# Patient Record
Sex: FEMALE | Race: WHITE | NOT HISPANIC OR LATINO | Employment: STUDENT | URBAN - METROPOLITAN AREA
[De-identification: names, ages, dates, MRNs, and addresses within clinical notes are randomized per-mention and may not be internally consistent; named-entity substitution may affect disease eponyms.]

---
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
## 2020-02-06 ENCOUNTER — DOCUMENTATION ONLY (OUTPATIENT)
Dept: FAMILY MEDICINE | Facility: CLINIC | Age: 20
End: 2020-02-06
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
## 2020-02-10 ENCOUNTER — OFFICE VISIT (OUTPATIENT)
Dept: FAMILY MEDICINE | Facility: CLINIC | Age: 20
End: 2020-02-10
Payer: COMMERCIAL

## 2020-02-10 VITALS
SYSTOLIC BLOOD PRESSURE: 121 MMHG | HEIGHT: 69 IN | WEIGHT: 132.2 LBS | DIASTOLIC BLOOD PRESSURE: 74 MMHG | BODY MASS INDEX: 19.58 KG/M2 | HEART RATE: 90 BPM

## 2020-02-10 DIAGNOSIS — F41.1 GAD (GENERALIZED ANXIETY DISORDER): Primary | ICD-10-CM

## 2020-02-10 DIAGNOSIS — G89.29 CHRONIC LEFT-SIDED LOW BACK PAIN WITHOUT SCIATICA: ICD-10-CM

## 2020-02-10 DIAGNOSIS — M54.50 CHRONIC LEFT-SIDED LOW BACK PAIN WITHOUT SCIATICA: ICD-10-CM

## 2020-02-10 ASSESSMENT — MIFFLIN-ST. JEOR: SCORE: 1439.04
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
## 2020-02-10 NOTE — LETTER
Date:February 24, 2020      Patient was self referred, no letter generated. Do not send.        [facility] Health Information
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
## 2020-02-11 DIAGNOSIS — G89.29 CHRONIC LEFT-SIDED LOW BACK PAIN WITHOUT SCIATICA: Primary | ICD-10-CM

## 2020-02-11 DIAGNOSIS — M54.50 CHRONIC LEFT-SIDED LOW BACK PAIN WITHOUT SCIATICA: Primary | ICD-10-CM

## 2020-02-13 ENCOUNTER — ANCILLARY PROCEDURE (OUTPATIENT)
Dept: MRI IMAGING | Facility: CLINIC | Age: 20
End: 2020-02-13
Attending: FAMILY MEDICINE
Payer: COMMERCIAL

## 2020-02-13 DIAGNOSIS — M54.50 CHRONIC LEFT-SIDED LOW BACK PAIN WITHOUT SCIATICA: ICD-10-CM

## 2020-02-13 DIAGNOSIS — G89.29 CHRONIC LEFT-SIDED LOW BACK PAIN WITHOUT SCIATICA: ICD-10-CM

## 2020-02-17 ENCOUNTER — OFFICE VISIT (OUTPATIENT)
Dept: FAMILY MEDICINE | Facility: CLINIC | Age: 20
End: 2020-02-17
Payer: COMMERCIAL

## 2020-02-17 VITALS
SYSTOLIC BLOOD PRESSURE: 118 MMHG | HEART RATE: 94 BPM | WEIGHT: 135 LBS | HEIGHT: 69 IN | BODY MASS INDEX: 19.99 KG/M2 | DIASTOLIC BLOOD PRESSURE: 76 MMHG

## 2020-02-17 DIAGNOSIS — G89.29 CHRONIC LEFT-SIDED LOW BACK PAIN WITHOUT SCIATICA: Primary | ICD-10-CM

## 2020-02-17 DIAGNOSIS — M54.50 CHRONIC LEFT-SIDED LOW BACK PAIN WITHOUT SCIATICA: Primary | ICD-10-CM

## 2020-02-17 DIAGNOSIS — F41.1 GAD (GENERALIZED ANXIETY DISORDER): ICD-10-CM

## 2020-02-17 ASSESSMENT — MIFFLIN-ST. JEOR: SCORE: 1451.74
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
## 2020-02-17 NOTE — LETTER
Date:February 24, 2020      Patient was self referred, no letter generated. Do not send.        [facility] Health Information
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
## 2020-02-27 ENCOUNTER — DOCUMENTATION ONLY (OUTPATIENT)
Dept: FAMILY MEDICINE | Facility: CLINIC | Age: 20
End: 2020-02-27
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
## 2020-04-13 ENCOUNTER — VIRTUAL VISIT (OUTPATIENT)
Dept: FAMILY MEDICINE | Facility: CLINIC | Age: 20
End: 2020-04-13
Payer: COMMERCIAL

## 2020-04-13 DIAGNOSIS — M54.50 LEFT-SIDED LOW BACK PAIN WITHOUT SCIATICA, UNSPECIFIED CHRONICITY: ICD-10-CM

## 2020-04-13 DIAGNOSIS — F41.1 GAD (GENERALIZED ANXIETY DISORDER): ICD-10-CM
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
## 2020-04-29 DIAGNOSIS — E61.1 IRON DEFICIENCY: Primary | ICD-10-CM

## 2020-05-29 DIAGNOSIS — J02.9 PHARYNGITIS, UNSPECIFIED ETIOLOGY: Primary | ICD-10-CM

## 2020-05-29 DIAGNOSIS — R21 RASH AND NONSPECIFIC SKIN ERUPTION: ICD-10-CM

## 2020-05-29 DIAGNOSIS — J02.9 PHARYNGITIS, UNSPECIFIED ETIOLOGY: ICD-10-CM

## 2020-05-29 LAB
BASOPHILS # BLD AUTO: 0 10E9/L (ref 0–0.2)
BASOPHILS NFR BLD AUTO: 0.2 %
DIFFERENTIAL METHOD BLD: NORMAL
EOSINOPHIL # BLD AUTO: 0.3 10E9/L (ref 0–0.7)
EOSINOPHIL NFR BLD AUTO: 4.3 %
ERYTHROCYTE [DISTWIDTH] IN BLOOD BY AUTOMATED COUNT: 12.2 % (ref 10–15)
HCT VFR BLD AUTO: 41.1 % (ref 35–47)
HETEROPH AB SER QL: POSITIVE
HGB BLD-MCNC: 13.2 G/DL (ref 11.7–15.7)
IMM GRANULOCYTES # BLD: 0 10E9/L (ref 0–0.4)
IMM GRANULOCYTES NFR BLD: 0.5 %
LYMPHOCYTES # BLD AUTO: 1.7 10E9/L (ref 0.8–5.3)
LYMPHOCYTES NFR BLD AUTO: 27.4 %
MCH RBC QN AUTO: 30.8 PG (ref 26.5–33)
MCHC RBC AUTO-ENTMCNC: 32.1 G/DL (ref 31.5–36.5)
MCV RBC AUTO: 96 FL (ref 78–100)
MONOCYTES # BLD AUTO: 0.5 10E9/L (ref 0–1.3)
MONOCYTES NFR BLD AUTO: 8.4 %
NEUTROPHILS # BLD AUTO: 3.8 10E9/L (ref 1.6–8.3)
NEUTROPHILS NFR BLD AUTO: 59.2 %
NRBC # BLD AUTO: 0 10*3/UL
NRBC BLD AUTO-RTO: 0 /100
PLATELET # BLD AUTO: 310 10E9/L (ref 150–450)
RBC # BLD AUTO: 4.28 10E12/L (ref 3.8–5.2)
WBC # BLD AUTO: 6.3 10E9/L (ref 4–11)

## 2020-05-29 RX ORDER — PREDNISONE 20 MG/1
40 TABLET ORAL DAILY
Qty: 14 TABLET | Refills: 0 | Status: SHIPPED | OUTPATIENT
Start: 2020-05-29 | End: 2020-06-05

## 2020-06-01 LAB
EBV NA IGG SER QL IA: <0.2 AI (ref 0–0.8)
EBV VCA IGG SER QL IA: <0.2 AI (ref 0–0.8)
EBV VCA IGM SER QL IA: 1.5 AI (ref 0–0.8)

## 2020-06-11 DIAGNOSIS — F41.1 GAD (GENERALIZED ANXIETY DISORDER): ICD-10-CM

## 2020-06-22 ENCOUNTER — OFFICE VISIT (OUTPATIENT)
Dept: ORTHOPEDICS | Facility: CLINIC | Age: 20
End: 2020-06-22
Payer: COMMERCIAL

## 2020-06-22 DIAGNOSIS — B27.00 GAMMAHERPESVIRAL MONONUCLEOSIS WITHOUT COMPLICATION: Primary | ICD-10-CM

## 2020-06-22 NOTE — LETTER
"  6/22/2020      RE: Smitha Seaman  [address] [address]       TELEPHONE VISIT    The patient has been notified of following:     \"This telephone visit will be conducted via a call between you and your physician/provider. We have found that certain health care needs can be provided without the need for a physical exam.  This service lets us provide the care you need with a short phone conversation.  If a prescription is necessary we can send it directly to your pharmacy.  If lab work is needed we can place an order for that and you can then stop by our lab to have the test done at a later time.    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  CC was seen by  virtually for a ST and started on amoxillin for presumed strep throat.  After 2 daysday of medication she broke out in a full body rash.  She had previously taken amoxicillin with out problems before.  She thinks her first symptoms were around May 25th.    -+fatigue and swollen lymph nodes anteriorly and posteriorly.    -Still getting tired by the end of the day.  Feels approx 80-90% recovered.  Denies abdominal pain.  ST resolved after a few days of her symptoms starting.    -Rash resolved  -She developed a UTI and was treated with Nitrofurotin with good symptom improvement.   -Lymph nodes are still swollen anteriorly but greatly improved.   -Her brother was diagnosed with mono too.         Current Outpatient Medications   Medication     ferrous sulfate (SLO-FE) 142 (45 Fe) MG CR tablet     sertraline (ZOLOFT) 50 MG tablet     No current " facility-administered medications for this visit.            Objective    NAD  There were no vitals taken for this visit.  HEENT:  Neg   NecK:  1/2 cm AC LAD L>R, no  PC LAD  Heart; rrr  Lungs; cta  Abd; Soft and nttp, no HSM  Skin:  No rash.     Assessment/Plan:  1. Resolving Mono with some symptoms persisting.     PLAN:  No training until this weekend and then start with a slow jog, bike ride and seen how that goes with her energy level.  She is supposed to start light conditioning training with the team on July 1 which may need to be modified depending on how she is feeling.  Will notify her ATC and .  She may advance as tolerated based on her symptoms.     Janna Caba MD, CAQ, FACSM, CCD  [facility]  Sports Medicine and Bone Health  Team Physician;  Athletics              Janna Caba MD

## 2020-06-22 NOTE — PROGRESS NOTES
"TELEPHONE VISIT    The patient has been notified of following:     \"This telephone visit will be conducted via a call between you and your physician/provider. We have found that certain health care needs can be provided without the need for a physical exam.  This service lets us provide the care you need with a short phone conversation.  If a prescription is necessary we can send it directly to your pharmacy.  If lab work is needed we can place an order for that and you can then stop by our lab to have the test done at a later time.    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  CC was seen by  virtually for a ST and started on amoxillin for presumed strep throat.  After 2 daysday of medication she broke out in a full body rash.  She had previously taken amoxicillin with out problems before.  She thinks her first symptoms were around May 25th.    -+fatigue and swollen lymph nodes anteriorly and posteriorly.    -Still getting tired by the end of the day.  Feels approx 80-90% recovered.  Denies abdominal pain.  ST resolved after a few days of her symptoms starting.    -Rash resolved  -She developed a UTI and was treated with Nitrofurotin with good symptom improvement.   -Lymph nodes are still swollen anteriorly but greatly improved.   -Her brother was diagnosed with mono too.         Current Outpatient Medications   Medication     ferrous sulfate (SLO-FE) 142 (45 Fe) MG CR tablet     sertraline (ZOLOFT) 50 MG tablet     No current facility-administered medications for this visit.            Objective    NAD  There were no " vitals taken for this visit.  HEENT:  Neg   NecK:  1/2 cm AC LAD L>R, no  PC LAD  Heart; rrr  Lungs; cta  Abd; Soft and nttp, no HSM  Skin:  No rash.     Assessment/Plan:  1. Resolving Mono with some symptoms persisting.     PLAN:  No training until this weekend and then start with a slow jog, bike ride and seen how that goes with her energy level.  She is supposed to start light conditioning training with the team on July 1 which may need to be modified depending on how she is feeling.  Will notify her ATC and .  She may advance as tolerated based on her symptoms.     Janna Caba MD, CAQ, FACSM, CCD  [facility]  Sports Medicine and Bone Health  Team Physician;  Athletics

## 2020-06-22 NOTE — LETTER
Date:June 23, 2020      Patient was self referred, no letter generated. Do not send.        [facility] Health Information

## 2020-06-23 DIAGNOSIS — Z11.59 SPECIAL SCREENING EXAMINATION FOR VIRAL DISEASE: Primary | ICD-10-CM

## 2020-06-26 DIAGNOSIS — Z11.59 SPECIAL SCREENING EXAMINATION FOR VIRAL DISEASE: ICD-10-CM

## 2020-06-26 NOTE — LETTER
June 29, 2020        Smitha IQBAL Urszula  [address]  [place] [address]        COVID-19 Antibody, IgG   Date Value Ref Range Status   06/26/2020 Negative NEG^Negative Final     Comment:     Negative results do not rule out SARS-CoV-2 infection, particularly in those   who have been in contact with the virus.  Follow-up testing with a molecular   diagnostic should be considered to rule out infection in these individuals.  Results from antibody testing should not be used as the sole basis to diagnose   or exclude SARS-CoV-2 infection or to inform infection status.           You have tested NEGATIVE for COVID-19 antibodies. This suggests you have not had or been exposed to COVID-19. But it does not mean that for sure.     The test finds antibodies in most people 10 days after they get sick. For some people, it takes longer than 10 days for antibodies to show up. Others may never show antibodies against COVID-19, especially if they have weak immune systems.    If you have COVID-19 symptoms now, please stay home and away from others.     What is antibody testing?    This is a kind of blood test. We take a small sample of your blood, and then test it for something called  antibodies.      Your body makes antibodies to fight infection. If your blood has antibodies for a certain germ, it means you ve been infected with that germ in the past.     Sometimes, antibodies stay in your body for years after you ve had the infection. They can be there even if the germ didn t make you sick. They are a sign that your body fought off the infection.    Will this test find antibodies in everyone who s had COVID-19?    No. The test finds antibodies in most people 10 days after they get sick. For some people, it takes longer than 10 days for antibodies to show up. Others may never show antibodies against COVID-19, especially if they have weak immune systems.    What does it mean if the test finds COVID-19 antibodies?    If we find  these antibodies, it suggests:     This person has had the virus.     Their body s immune system fought the virus.     We don t know if this will help protect someone from getting COVID-19 again. Scientists are still learning about this.    What are the signs of COVID-19?    Signs of COVID-19 can appear from 2 to 14 days (up to 2 weeks) after you re infected. Some people have no symptoms or only mild symptoms. Others get very sick. The most common symptoms are:          Cough    Shortness of breath or trouble breathing  Or at least 2 of these symptoms:    Fever    Chills    Repeated shaking with chills    Muscle pain    Headache    Sore throat    Losing your sense of taste or smell    You may have other symptoms. Please contact your doctor or clinic for any symptoms that worry you.    Where can I get more information?     To learn the [facility] guidelines for staying home, please visit the [place] Department of Health website at [email or web address].us/diseases/coronavirus/basics.html    To learn more about COVID-19 and how to care for yourself at home, please visit the CDC website at https://www.cdc.gov/coronavirus/2019-ncov/about/steps-when-sick.html    For more options for care at [facility], please visit our website at [email or web address]    [facility] ([facility]) COVID-19 Hotline:  [phone]

## 2020-06-26 NOTE — LETTER
June 28, 2020        Smitha Seaman  [address] [place] [address]    This letter provides a written record that you were tested for COVID-19 on 6/26/20.       Your result was negative. This means that we didn t find the virus that causes COVID-19 in your sample. A test may show negative when you do actually have the virus. This can happen when the virus is in the early stages of infection, before you feel illness symptoms.    If you have symptoms   Stay home and away from others (self-isolate) until you meet ALL of the guidelines below:    You ve had no fever--and no medicine that reduces fever--for 3 full days (72 hours). And      Your other symptoms have gotten better. For example, your cough or breathing has improved. And     At least 10 days have passed since your symptoms started.    During this time:    Stay home. Don t go to work, school or anywhere else.     Stay in your own room, including for meals. Use your own bathroom if you can.    Stay away from others in your home. No hugging, kissing or shaking hands. No visitors.    Clean  high touch  surfaces often (doorknobs, counters, handles, etc.). Use a household cleaning spray or wipes. You can find a full list on the EPA website at www.epa.gov/pesticide-registration/list-n-disinfectants-use-against-sars-cov-2.    Cover your mouth and nose with a mask, tissue or washcloth to avoid spreading germs.    Wash your hands and face often with soap and water.    Going back to work  Check with your employer for any guidelines to follow for going back to work.    Employers: This document serves as formal notice that your employee tested negative for COVID-19, as of the testing date shown above.

## 2020-06-27 LAB
SARS-COV-2 RNA SPEC QL NAA+PROBE: NOT DETECTED
SPECIMEN SOURCE: NORMAL

## 2020-06-28 DIAGNOSIS — Z11.59 SPECIAL SCREENING EXAMINATION FOR VIRAL DISEASE: ICD-10-CM

## 2020-06-29 LAB
COVID-19 ANTIBODY IGG: NEGATIVE
LAB TEST METHOD: NORMAL
SARS-COV-2 RNA SPEC QL NAA+PROBE: NOT DETECTED
SPECIMEN SOURCE: NORMAL

## 2020-07-23 ENCOUNTER — DOCUMENTATION ONLY (OUTPATIENT)
Dept: FAMILY MEDICINE | Facility: CLINIC | Age: 20
End: 2020-07-23
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
## 2020-07-27 DIAGNOSIS — Z11.59 SPECIAL SCREENING EXAMINATION FOR VIRAL DISEASE: ICD-10-CM

## 2020-07-28 LAB
SARS-COV-2 RNA SPEC QL NAA+PROBE: NOT DETECTED
SPECIMEN SOURCE: NORMAL

## 2020-08-03 ENCOUNTER — DOCUMENTATION ONLY (OUTPATIENT)
Dept: FAMILY MEDICINE | Facility: CLINIC | Age: 20
End: 2020-08-03
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
## 2020-08-12 DIAGNOSIS — Z11.59 SPECIAL SCREENING EXAMINATION FOR VIRAL DISEASE: Primary | ICD-10-CM

## 2020-08-13 DIAGNOSIS — Z11.59 SPECIAL SCREENING EXAMINATION FOR VIRAL DISEASE: ICD-10-CM

## 2020-08-15 LAB
SARS-COV-2 RNA SPEC QL NAA+PROBE: NOT DETECTED
SPECIMEN SOURCE: NORMAL

## 2020-08-18 DIAGNOSIS — Z11.59 SPECIAL SCREENING EXAMINATION FOR VIRAL DISEASE: Primary | ICD-10-CM

## 2020-08-20 DIAGNOSIS — Z11.59 SPECIAL SCREENING EXAMINATION FOR VIRAL DISEASE: ICD-10-CM

## 2020-08-21 LAB
SARS-COV-2 RNA SPEC QL NAA+PROBE: NOT DETECTED
SPECIMEN SOURCE: NORMAL

## 2020-08-27 DIAGNOSIS — Z11.59 SPECIAL SCREENING EXAMINATION FOR VIRAL DISEASE: ICD-10-CM

## 2020-08-28 LAB
SARS-COV-2 RNA SPEC QL NAA+PROBE: NOT DETECTED
SPECIMEN SOURCE: NORMAL

## 2020-09-09 DIAGNOSIS — Z11.59 SPECIAL SCREENING EXAMINATION FOR VIRAL DISEASE: Primary | ICD-10-CM

## 2020-09-12 DIAGNOSIS — Z11.59 SPECIAL SCREENING EXAMINATION FOR VIRAL DISEASE: ICD-10-CM

## 2020-09-12 LAB
SARS-COV-2 RNA SPEC QL NAA+PROBE: NOT DETECTED
SPECIMEN SOURCE: NORMAL

## 2020-09-17 DIAGNOSIS — Z11.59 SPECIAL SCREENING EXAMINATION FOR VIRAL DISEASE: Primary | ICD-10-CM

## 2020-09-18 DIAGNOSIS — Z11.59 SPECIAL SCREENING EXAMINATION FOR VIRAL DISEASE: ICD-10-CM

## 2020-09-19 LAB
SARS-COV-2 RNA SPEC QL NAA+PROBE: NOT DETECTED
SPECIMEN SOURCE: NORMAL

## 2020-09-29 DIAGNOSIS — Z11.59 SPECIAL SCREENING EXAMINATION FOR VIRAL DISEASE: ICD-10-CM

## 2020-09-30 LAB
SARS-COV-2 RNA SPEC QL NAA+PROBE: NOT DETECTED
SPECIMEN SOURCE: NORMAL

## 2020-10-09 DIAGNOSIS — Z11.59 SPECIAL SCREENING EXAMINATION FOR VIRAL DISEASE: ICD-10-CM

## 2020-10-09 PROCEDURE — 99000 SPECIMEN HANDLING OFFICE-LAB: CPT | Performed by: PATHOLOGY

## 2020-10-09 PROCEDURE — U0003 INFECTIOUS AGENT DETECTION BY NUCLEIC ACID (DNA OR RNA); SEVERE ACUTE RESPIRATORY SYNDROME CORONAVIRUS 2 (SARS-COV-2) (CORONAVIRUS DISEASE [COVID-19]), AMPLIFIED PROBE TECHNIQUE, MAKING USE OF HIGH THROUGHPUT TECHNOLOGIES AS DESCRIBED BY CMS-2020-01-R: HCPCS | Mod: 90 | Performed by: PATHOLOGY

## 2020-10-10 LAB
LABORATORY COMMENT REPORT: NORMAL
SARS-COV-2 RNA SPEC QL NAA+PROBE: ABNORMAL
SARS-COV-2 RNA SPEC QL NAA+PROBE: NORMAL
SARS-COV-2 RNA SPEC QL NAA+PROBE: NORMAL
SPECIMEN SOURCE: ABNORMAL
SPECIMEN SOURCE: NORMAL
SPECIMEN SOURCE: NORMAL

## 2020-10-11 ENCOUNTER — TELEPHONE (OUTPATIENT)
Dept: EMERGENCY MEDICINE | Facility: CLINIC | Age: 20
End: 2020-10-11

## 2020-10-11 NOTE — TELEPHONE ENCOUNTER
"Coronavirus (COVID-19) Notification    Caller Name (Patient, parent, daughter/son, grandparent, etc)  Patient     Reason for call  Notify of Positive Coronavirus (COVID-19) lab results, assess symptoms,  review [facility] recommendations    Lab Result    Lab test:  2019-nCoV rRt-PCR or SARS-CoV-2 PCR    Oropharyngeal AND/OR nasopharyngeal swabs is POSITIVE for 2019-nCoV RNA/SARS-COV-2 PCR (COVID-19 virus)    RN Recommendations/Instructions per [facility] Coronavirus COVID-19 recommendations    Brief introduction script  Introduce self then review script:  \"I am calling on behalf of [employer].  We were notified that your Coronavirus test (COVID-19) for was POSITIVE for the virus.  I have some information to relay to you but first I wanted to mention that the [place] Dept of Health will be contacting you shortly [it's possible MD already called Patient] to talk to you more about how you are feeling and other people you have had contact with who might now also have the virus.  Also, [facility] is Partnering with the [facility] for Covid-19 research, you may be contacted directly by research staff.\"    Assessment (Inquire about Patient's current symptoms)   Assessment   Current Symptoms at time of phone call: (if no symptoms, document No symptoms]  None    Symptoms onset (if applicable)  N/A     If at time of call, Patients symptoms hare worsened, the Patient should contact 911 or have someone drive them to Emergency Dept promptly:      If Patient calling 911, inform 911 personal that you have tested positive for the Coronavirus (COVID-19).  Place mask on and await 911 to arrive.    If Emergency Dept, If possible, please have another adult drive you to the Emergency Dept but you need to wear mask when in contact with other people.      Review information with Patient    Your result was positive. This means you have COVID-19 (coronavirus).  We have sent you a letter that reviews the " information that I'll be reviewing with you now.    How can I protect others?    If you have symptoms: stay home and away from others (self-isolate) until:    You've had no fever--and no medicine that reduces fever--for 1 full day (24 hours). And       Your other symptoms have gotten better. For example, your cough or breathing has improved. And     At least 10 days have passed since your symptoms started. (If you've been told by a doctor that you have a weak immune system, wait 20 days.)     If you don't have symptoms: Stay home and away from others (self-isolate) until at least 10 days have passed since your first positive COVID-19 test. (Date test collected)    During this time:    Stay in your own room, including for meals. Use your own bathroom if you can.    Stay away from others in your home. No hugging, kissing or shaking hands. No visitors.     Don't go to work, school or anywhere else.     Clean  high touch  surfaces often (doorknobs, counters, handles, etc.). Use a household cleaning spray or wipes. You'll find a full list on the EPA website at www.epa.gov/pesticide-registration/list-n-disinfectants-use-against-sars-cov-2.     Cover your mouth and nose with a mask, tissue or other face covering to avoid spreading germs.    Wash your hands and face often with soap and water.    Caregivers in these groups are at risk for severe illness due to COVID-19:  o People 65 years and older  o People who live in a nursing home or long-term care facility  o People with chronic disease (lung, heart, cancer, diabetes, kidney, liver, immunologic)  o People who have a weakened immune system, including those who:  - Are in cancer treatment  - Take medicine that weakens the immune system, such as corticosteroids  - Had a bone marrow or organ transplant  - Have an immune deficiency  - Have poorly controlled HIV or AIDS  - Are obese (body mass index of 40 or higher)  - Smoke regularly    Caregivers should wear gloves while  washing dishes, handling laundry and cleaning bedrooms and bathrooms.    Wash and dry laundry with special caution. Don't shake dirty laundry, and use the warmest water setting you can.    If you have a weakened immune system, ask your doctor about other actions you should take.    For more tips, go to www.cdc.gov/coronavirus/2019-ncov/downloads/10Things.pdf.    You should not go back to work until you meet the guidelines above for ending your home isolation. You don't need to be retested for COVID-19 before going back to work--studies show that you won't spread the virus if it's been at least 10 days since your symptoms started (or 20 days, if you have a weak immune system).    Employers: This document serves as formal notice of your employee's medical guidelines for going back to work. They must meet the above guidelines before going back to work in person.    How can I take care of myself?    1. Get lots of rest. Drink extra fluids (unless a doctor has told you not to).    2. Take Tylenol (acetaminophen) for fever or pain. If you have liver or kidney problems, ask your family doctor if it's okay to take Tylenol.     Take either:     650 mg (two 325 mg pills) every 4 to 6 hours, or     1,000 mg (two 500 mg pills) every 8 hours as needed.     Note: Don't take more than 3,000 mg in one day. Acetaminophen is found in many medicines (both prescribed and over-the-counter medicines). Read all labels to be sure you don't take too much.    For children, check the Tylenol bottle for the right dose (based on their age or weight).    3. If you have other health problems (like cancer, heart failure, an organ transplant or severe kidney disease): Call your specialty clinic if you don't feel better in the next 2 days.    4. Know when to call 911: Emergency warning signs include:    Trouble breathing or shortness of breath    Pain or pressure in the chest that doesn't go away    Feeling confused like you haven't felt before, or  not being able to wake up    Bluish-colored lips or face    5. Sign up for [employer] We know it's scary to hear that you have COVID-19. We want to track your symptoms to make sure you're okay over the next 2 weeks. Please look for an email from [employer])--this is a free, online program that we'll use to keep in touch. To sign up, follow the link in the email. Learn more at [email or web address].    Where can I get more information?    [facility]: [email or web address]    Coronavirus Basics: www.health.[facility].[place]./diseases/coronavirus/basics.html    What to Do If You're Sick: www.cdc.gov/coronavirus/2019-ncov/about/steps-when-sick.html    Ending Home Isolation: www.cdc.gov/coronavirus/2019-ncov/hcp/disposition-in-home-patients.html     Caring for Someone with COVID-19: www.cdc.gov/coronavirus/2019-ncov/if-you-are-sick/care-for-someone.html     [facility] clinical trials (COVID-19 research studies): clinicalaffairs.[facility]-clinical-trials     A Positive COVID-19 letter will be sent via [employer] or the mail. (Exception, no letters sent to Presurgerical/Preprocedure Patients)    [Name]  Kevin Edwards RN  [employer] [facility]  COVID19 Results Team RN  Ph# [phone]

## 2020-10-12 ENCOUNTER — DOCUMENTATION ONLY (OUTPATIENT)
Dept: FAMILY MEDICINE | Facility: CLINIC | Age: 20
End: 2020-10-12

## 2020-10-12 ENCOUNTER — VIRTUAL VISIT (OUTPATIENT)
Dept: FAMILY MEDICINE | Facility: CLINIC | Age: 20
End: 2020-10-12
Payer: COMMERCIAL

## 2020-10-12 DIAGNOSIS — U07.1 COVID-19: Primary | ICD-10-CM

## 2020-10-12 NOTE — PROGRESS NOTES
[facility] ATHLETICS  Nikolas ATC initial assessment note  Date of service performed: 10/12/2020    Concern: COVID-19 Positive  Body part: N/A  Description: N/A  Injury: COVID-19  Type: Non-athletics related  Date of injury: 10/09/2020    S: Pt was notified on 10/11/2020 that she tested positive for the COVID-19 virus as part of Volleyball team surveillance testing that was performed on 10/09/2020.  Pt was at volleyball practice with the rest of the team on 10/6/2020 and 10/8/2020.  She was not wearing a mask during practice.  Pt completed a 14 day quarantine on September 20, 2020 after she was in contact with a COVID-positive individual.  Pt had 2 PCR tests that were negative during her previous quarantine.    Pt is able to quranatine at her home in [place], [place] and went to that location last evening.  She has a virtual visit this morning with Dr. Caba and Dr. Braxton.  At this point, she reports that she had a fever of 99.9 F last evening and 99.7 F this morning.  She reported to Dr. Braxton that she had jaw pain yesterday that resulted in a headache but this resolved during the day.         P: Pt will remain in isolation in [place], [place].  Barring an increase or new presentation of symptoms, Pt will be able to start her reacclimitization period on Day 15 (10/23/2020).  She will be scheduled for a EKG, cardiac ECHO, troponin blood test and cardiac MRI.  Pt has an appointment with Dr. Micaela Hdz on 10/14/2020.   I will continue to monitor her condition remotely.    Maximiliano Arriaga, ATC      [facility] ATHLETICS  Nikolas ATC follow-up note  Date of service performed: 11/05/2020    Concern/injury: COVID-19    Assessment/plan: Pt completed the isolation period on 10/21/2020 and was informed by [facility] that she could return to her apartment on campus.  Pt denies any further symptoms.  Her follow-up care was the following:  10/21/2020: Troponin lab @ [facility]  10/21/2020:  EKG & Echocardiogram @ [facility]  10/27/2020: Cardiac MRI @ [facility]  10/28/2020: In-person MD visit @ [facility].  Pt completed stationary bike workout on 10/23/2020 with a pulse oximeter.  Pt wasx on the bike for 20 minutes at a moderate pace with the pulse oximeter not dropping below 95%.  Pt began acclimation to VB activity on 10/29/2020 and has been practicing without issue.  The [facility] COVID-19 Protocol has been completed.      Maximiliano Arriaga, ATC

## 2020-10-12 NOTE — PROGRESS NOTES
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 Tested positive on 10/9/2020. Patient endorses symptoms for the last 3 day/s. Patient is experiencing fever, sweats and headache. Patient denies chills, runny nose, stuffy nose, cough - non-productive, cough - productive, wheezing, shortness of breath, sore throat, hoarse voice, facial pain/pressure, headache, body aches, fatigue, nausea, vomiting and diarrhea. Their course is same.  Patient has tried nothing at home for her symptoms.     Athlete is currently in quarantine at home.     PMH, Medications and Allergies were reviewed and updated as needed.    ROS:  As noted above in HPI otherwise negative.    There is no problem list on file for this patient.      Current Outpatient Medications   Medication Sig Dispense Refill     ferrous sulfate (SLO-FE) 142 (45 Fe) MG CR tablet Take 1 tablet (142 mg) by mouth daily 93 tablet 1     sertraline (ZOLOFT) 50 MG tablet Take 2 tablets (100 mg) by mouth daily 186 tablet 1              OBJECTIVE:     There were no vitals taken for this visit.  Estimated body mass index is 19.94 kg/m  as calculated from the following:    Height as of 2/17/20: 1.753 m (5' 9\").    Weight as of 2/17/20: 61.2 kg (135 lb).    GENERAL: Healthy, alert and no distress  EYES: Eyes grossly normal to inspection.  No discharge or erythema, or obvious scleral/conjunctival abnormalities.  RESP: No audible wheeze, cough, or visible cyanosis.  No visible retractions or increased work of breathing.    SKIN: Visible skin clear. No significant rash, abnormal pigmentation or lesions.  NEURO: Cranial nerves grossly intact.  Mentation and speech appropriate for age.  PSYCH: Mentation appears normal, affect normal/bright, judgement and insight intact, normal speech and appearance well-groomed.    Results " from last visit:  Orders Only on 10/09/2020   Component Date Value Ref Range Status     COVID-19 Virus PCR to [facility]* 10/09/2020 Nasopharyngeal   Final     COVID-19 Virus PCR to [facility] - Re* 10/09/2020 Test received-See reflex to IDDL test SARS CoV2 (COVID-19) Virus RT-PCR   Final     SARS-CoV-2 Virus Specimen Source 10/09/2020 Canceled, Test credited   Final     SARS-CoV-2 PCR Result 10/09/2020 Canceled, Test credited   Final     SARS-CoV-2 PCR Comment 10/09/2020 Canceled, Test credited   Final     COVID-19 Virus PCR to [facility] - So* 10/09/2020 Nasopharyngeal   Final     COVID-19 Virus PCR to [facility]* 10/09/2020 Detected, Abnormal Result*  Final    Comment: Positive for 2019-nCoV.  Testing was performed using the Aptima SARS-CoV-2 Assay on the 50 Cubes   Instrument System. Additional information about this Emergency Use   Authorization (EUA) assay can be found via the Lab Guide.  This test should be ordered for the detection of SARS-CoV-2 in individuals who   meet SARS-CoV-2 clinical and/or epidemiological criteria. Test performance is   unknown in asymptomatic patients.  This test is for in vitro diagnostic use under the FDA EUA for laboratories   certified under CLIA to perform high complexity testing. This test has not   been FDA cleared or approved.  A negative result does not rule out the presence of PCR inhibitors in the   specimen or target RNA in concentration below the limit of detection for the   assay. The possibility of a false negative should be considered if the   patient's recent exposure or clinical presentation suggests COVID-19.  Testing performed by [facility], [facility]                           [address], [address], [place] [address].  This   laboratory is certified under the Clinical Laboratory Improvement Amendments   of 1988 (CLIA-88) as qualified to perform high complexity laboratory testing.                 ASSESSMENT & PLAN:      Smitha was seen today for suspected covid.    Diagnoses and all orders for this visit:    COVID-19  -     Troponin I  -     EKG 12-lead complete with read (future); Future  -     MRI Cardiac w/contrast; Future  -     Echocardiogram Complete; Future      - Deemed safe to quarantine at home  - Following end of quarantine athlete progress through return to play post COVID protocol  - [employer] Return to Play Testing will be performed after quarantine. Future orders placed   - EKG, Troponin, ECHO, and Cardiac MRI    Return to clinic following above testing to review results. Return sooner if develops new or worsening symptoms.    Options for treatment and/or follow-up care were reviewed with the patient and , Maximiliano. Patient was actively involved in the decision making process. Patient verbalized understanding and was in agreement with the plan.    The patient was seen by and discussed with Dr.Suzanne CHARLES Caba MD, CAQ, CCD    Hoda Braxton,   Primary Care Sports Medicine Fellow      ----------------------------------------------------------------------------------------------------    Video-Visit Details    Type of service:  Video Visit    Video Start Time: 11:40 am    Video End Time (time video stopped): 12:00am    Total Visit time: 20 min    Originating Location (pt. Location): Home     Distant Location (provider location):  [facility] STUDENT ATHLETIC CLINIC     Platform used for Video Visit: AppDirect

## 2020-10-12 NOTE — LETTER
Date:October 19, 2020      Patient was self referred, no letter generated. Do not send.        [facility] Health Information

## 2020-10-12 NOTE — LETTER
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 Tested positive on 10/9/2020. Patient endorses symptoms for the last 3 day/s. Patient is experiencing fever, sweats and headache. Patient denies chills, runny nose, stuffy nose, cough - non-productive, cough - productive, wheezing, shortness of breath, sore throat, hoarse voice, facial pain/pressure, headache, body aches, fatigue, nausea, vomiting and diarrhea. Their course is same.  Patient has tried nothing at home for her symptoms.     Athlete is currently in quarantine at home.     PMH, Medications and Allergies were reviewed and updated as needed.    ROS:  As noted above in HPI otherwise negative.    There is no problem list on file for this patient.      Current Outpatient Medications   Medication Sig Dispense Refill     ferrous sulfate (SLO-FE) 142 (45 Fe) MG CR tablet Take 1 tablet (142 mg) by mouth daily 93 tablet 1     sertraline (ZOLOFT) 50 MG tablet Take 2 tablets (100 mg) by mouth daily 186 tablet 1              OBJECTIVE:     There were no vitals taken for this visit.  Estimated body mass index is 19.94 kg/m  as calculated from the following:    Height as of 2/17/20: 1.753 m (5' 9\").    Weight as of 2/17/20: 61.2 kg (135 lb).    GENERAL: Healthy, alert and no distress  EYES: Eyes grossly normal to inspection.  No discharge or erythema, or obvious scleral/conjunctival abnormalities.  RESP: No audible wheeze, cough, or visible cyanosis.  No visible retractions or increased work of breathing.    SKIN: Visible skin clear. No significant rash, abnormal pigmentation or lesions.  NEURO: Cranial nerves grossly intact.  Mentation and speech appropriate for age.  PSYCH: Mentation appears normal, affect " normal/bright, judgement and insight intact, normal speech and appearance well-groomed.    Results from last visit:  Orders Only on 10/09/2020   Component Date Value Ref Range Status     COVID-19 Virus PCR to [facility] - So* 10/09/2020 Nasopharyngeal   Final     COVID-19 Virus PCR to [facility] - Re* 10/09/2020 Test received-See reflex to IDDL test SARS CoV2 (COVID-19) Virus RT-PCR   Final     SARS-CoV-2 Virus Specimen Source 10/09/2020 Canceled, Test credited   Final     SARS-CoV-2 PCR Result 10/09/2020 Canceled, Test credited   Final     SARS-CoV-2 PCR Comment 10/09/2020 Canceled, Test credited   Final     COVID-19 Virus PCR to [facility] - So* 10/09/2020 Nasopharyngeal   Final     COVID-19 Virus PCR to [facility] - Re* 10/09/2020 Detected, Abnormal Result*  Final    Comment: Positive for 2019-nCoV.  Testing was performed using the Sokoos SARS-CoV-2 Assay on the Aspects Software   Instrument System. Additional information about this Emergency Use   Authorization (EUA) assay can be found via the Lab Guide.  This test should be ordered for the detection of SARS-CoV-2 in individuals who   meet SARS-CoV-2 clinical and/or epidemiological criteria. Test performance is   unknown in asymptomatic patients.  This test is for in vitro diagnostic use under the FDA EUA for laboratories   certified under CLIA to perform high complexity testing. This test has not   been FDA cleared or approved.  A negative result does not rule out the presence of PCR inhibitors in the   specimen or target RNA in concentration below the limit of detection for the   assay. The possibility of a false negative should be considered if the   patient's recent exposure or clinical presentation suggests COVID-19.  Testing performed by [facility], [facility]                           [address], [address], [place] [address].  This   laboratory is certified under the Clinical Laboratory Improvement Amendments    of 1988 (CLIA-88) as qualified to perform high complexity laboratory testing.                ASSESSMENT & PLAN:      Smitha was seen today for suspected covid.    Diagnoses and all orders for this visit:    COVID-19  -     Troponin I  -     EKG 12-lead complete with read (future); Future  -     MRI Cardiac w/contrast; Future  -     Echocardiogram Complete; Future      - Deemed safe to quarantine at home  - Following end of quarantine athlete progress through return to play post COVID protocol  - BIG 10 Return to Play Testing will be performed after quarantine. Future orders placed   - EKG, Troponin, ECHO, and Cardiac MRI    Return to clinic following above testing to review results. Return sooner if develops new or worsening symptoms.    Options for treatment and/or follow-up care were reviewed with the patient and , Maximiliano. Patient was actively involved in the decision making process. Patient verbalized understanding and was in agreement with the plan.    The patient was seen by and discussed with Dr.Suzanne CHARLES Caba MD, CAQ, CCD    Hoda Braxton DO  Primary Care Sports Medicine Fellow      ----------------------------------------------------------------------------------------------------    Video-Visit Details    Type of service:  Video Visit    Video Start Time: 11:40 am    Video End Time (time video stopped): 12:00am    Total Visit time: 20 min    Originating Location (pt. Location): Home     Distant Location (provider location):  [facility]     Platform used for Video Visit: [facility]      Attending Note:   I have talked with this patient along with Dr. Braxton via video visit and have reviewed the clinical presentation and watched the video examination . I agree with the treatment plan as outlined. The plan was formulated with the fellow on the day of the patient's visit.   Janna Caba MD, CAQ, CCD  [facility]  Sports Medicine and Bone  [facility]        Janna Caba MD

## 2020-10-14 ENCOUNTER — DOCUMENTATION ONLY (OUTPATIENT)
Dept: FAMILY MEDICINE | Facility: CLINIC | Age: 20
End: 2020-10-14

## 2020-10-14 NOTE — PROGRESS NOTES
[facility] ATHLETIC MEDICINE  [facility]   Brief Check-in during quarantine/isolation    Due to COVID-19 pandemic, this check-in was conducted via telehealth services.     Date: 10/14/20  Duration of Call: 15 minutes  Student-athlete Name: CAREY Seaman     Called Smitha Seaman via phone to briefly check-in regarding how she is doing in isolation/quarantine. Listened and validated her experience. She shared that she is frustrated that this is her 3rd stint in quarantine and tested positive. She notes some symptoms and is isolating at home with a teammates.  Discussed possible challenges, what to expect and coping skills to use during this time. Encouraged maintaining a daily routine, staying connected with social support,  engaging in self-care activities, and reaching out for help if needed. Also shared mental health and wellness resources, and how to schedule a sport psychology session if she would like to follow-up in the near future. She continues to meet weekly with Ann Mandujano LP. A follow-up email was sent to the student-athlete with a list of mental health/wellness resources and an anonymous survey for them to complete, if interested, inquiring about their experience in isolation/quarantine.         Ronal Hdz, PhD LP, [facility]

## 2020-10-17 NOTE — PROGRESS NOTES
Attending Note:   I have talked with this patient along with Dr. Braxton via video visit and have reviewed the clinical presentation and watched the video examination . I agree with the treatment plan as outlined. The plan was formulated with the fellow on the day of the patient's visit.   Janna Caba MD, CAQ, CCD  [facility]  Sports Medicine and Bone Health

## 2020-10-21 ENCOUNTER — ANCILLARY PROCEDURE (OUTPATIENT)
Dept: CARDIOLOGY | Facility: CLINIC | Age: 20
End: 2020-10-21
Payer: COMMERCIAL

## 2020-10-21 DIAGNOSIS — U07.1 COVID-19: ICD-10-CM

## 2020-10-21 DIAGNOSIS — U07.1 LAB TEST POSITIVE FOR DETECTION OF COVID-19 VIRUS: ICD-10-CM

## 2020-10-21 DIAGNOSIS — A08.4 VIRAL GASTROENTERITIS: Primary | ICD-10-CM

## 2020-10-21 LAB — TROPONIN I SERPL-MCNC: <0.015 UG/L (ref 0–0.04)

## 2020-10-21 PROCEDURE — 84484 ASSAY OF TROPONIN QUANT: CPT | Performed by: PATHOLOGY

## 2020-10-21 PROCEDURE — U0003 INFECTIOUS AGENT DETECTION BY NUCLEIC ACID (DNA OR RNA); SEVERE ACUTE RESPIRATORY SYNDROME CORONAVIRUS 2 (SARS-COV-2) (CORONAVIRUS DISEASE [COVID-19]), AMPLIFIED PROBE TECHNIQUE, MAKING USE OF HIGH THROUGHPUT TECHNOLOGIES AS DESCRIBED BY CMS-2020-01-R: HCPCS | Mod: 90 | Performed by: PATHOLOGY

## 2020-10-21 PROCEDURE — 93306 TTE W/DOPPLER COMPLETE: CPT | Performed by: STUDENT IN AN ORGANIZED HEALTH CARE EDUCATION/TRAINING PROGRAM

## 2020-10-21 PROCEDURE — 36415 COLL VENOUS BLD VENIPUNCTURE: CPT | Performed by: PATHOLOGY

## 2020-10-21 PROCEDURE — 99000 SPECIMEN HANDLING OFFICE-LAB: CPT | Performed by: PATHOLOGY

## 2020-10-22 ENCOUNTER — APPOINTMENT (OUTPATIENT)
Dept: LAB | Facility: CLINIC | Age: 20
End: 2020-10-22
Payer: COMMERCIAL

## 2020-10-22 LAB
SARS-COV-2 RNA SPEC QL NAA+PROBE: NOT DETECTED
SPECIMEN SOURCE: NORMAL

## 2020-10-25 LAB — INTERPRETATION ECG - MUSE: NORMAL

## 2020-10-26 ENCOUNTER — DOCUMENTATION ONLY (OUTPATIENT)
Dept: FAMILY MEDICINE | Facility: CLINIC | Age: 20
End: 2020-10-26
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
## 2020-10-27 ENCOUNTER — HOSPITAL ENCOUNTER (OUTPATIENT)
Dept: CARDIOLOGY | Facility: CLINIC | Age: 20
Discharge: HOME | End: 2020-10-27
Attending: FAMILY MEDICINE | Admitting: FAMILY MEDICINE
Payer: COMMERCIAL

## 2020-10-27 VITALS — HEART RATE: 79 BPM | DIASTOLIC BLOOD PRESSURE: 71 MMHG | SYSTOLIC BLOOD PRESSURE: 121 MMHG

## 2020-10-27 DIAGNOSIS — U07.1 COVID-19: ICD-10-CM

## 2020-10-27 PROCEDURE — 75561 CARDIAC MRI FOR MORPH W/DYE: CPT

## 2020-10-27 PROCEDURE — 255N000002 HC RX 255 OP 636: Performed by: FAMILY MEDICINE

## 2020-10-27 PROCEDURE — A9585 GADOBUTROL INJECTION: HCPCS | Performed by: FAMILY MEDICINE

## 2020-10-27 PROCEDURE — 75561 CARDIAC MRI FOR MORPH W/DYE: CPT | Mod: 26 | Performed by: INTERNAL MEDICINE

## 2020-10-27 RX ORDER — GADOBUTROL 604.72 MG/ML
7.5 INJECTION INTRAVENOUS ONCE
Status: COMPLETED | OUTPATIENT
Start: 2020-10-27 | End: 2020-10-27

## 2020-10-27 RX ADMIN — GADOBUTROL 7.5 ML: 604.72 INJECTION INTRAVENOUS at 12:28

## 2020-10-27 NOTE — PROGRESS NOTES
Fernandez Bradford MD  P  [employer]             CORE with t2 mapping    Contrast administered per weight based protocol.

## 2020-10-28 ENCOUNTER — OFFICE VISIT (OUTPATIENT)
Dept: ORTHOPEDICS | Facility: CLINIC | Age: 20
End: 2020-10-28
Payer: COMMERCIAL

## 2020-10-28 VITALS
HEART RATE: 85 BPM | DIASTOLIC BLOOD PRESSURE: 82 MMHG | HEIGHT: 69 IN | BODY MASS INDEX: 20.44 KG/M2 | WEIGHT: 138 LBS | SYSTOLIC BLOOD PRESSURE: 121 MMHG

## 2020-10-28 DIAGNOSIS — U07.1 2019 NOVEL CORONAVIRUS DISEASE (COVID-19): Primary | ICD-10-CM

## 2020-10-28 ASSESSMENT — MIFFLIN-ST. JEOR: SCORE: 1460.34

## 2020-10-28 NOTE — LETTER
Date:October 29, 2020      Patient was self referred, no letter generated. Do not send.        [facility] Health Information

## 2020-10-28 NOTE — LETTER
10/28/2020      RE: Smitha Seaman  [address] [address]       CHIEF COMPLAINT:  Covid 19 Testing (RTP following positive COVID-19 test)       HISTORY OF PRESENT ILLNESS  Ms. 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  CAREY is doing quite well.  With exception of a fever early in her course she was asymptomatic throughout, she is asymptomatic today.  No current complaints or concerns.  CAREY has completed her cardiac testing.      Additional history: as documented    MEDICAL HISTORY  There is no problem list on file for this patient.      Current Outpatient Medications   Medication Sig Dispense Refill     ferrous sulfate (SLO-FE) 142 (45 Fe) MG CR tablet Take 1 tablet (142 mg) by mouth daily 93 tablet 1     sertraline (ZOLOFT) 50 MG tablet Take 2 tablets (100 mg) by mouth daily 186 tablet 1       No Known Allergies    No family history on file.    Additional medical/Social/Surgical histories reviewed in [facility] and updated as appropriate.        PHYSICAL EXAM    General: healthy, alert and no distress, no deformities, normal attention to grooming  HEENT: conjunctivae not injected, moist mucous membranes  Pulm: lungs clear bilaterally, normal effort  CV: HRRR, no murmur, normal peripheral perfusion  Musculoskeletal: normal gait  Neuro: mentation intact, speech is normal  Psych: normal affect         ASSESSMENT & PLAN  Ms. 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    I reviewed her cardiac testing in the room with her.  CAREY has a normal and reassuring EKG, echocardiogram, troponin, and cardiac MRI.    At this point CAREY can begin the return to play protocol.  This will be supervised by her , Maximiliano    We did discuss the continued need for wearing a mask, handwashing, and social distancing, as there is the possibility that the disease can  still be transmitted.  We also discussed the relative unknown with regards to antibody and immunity status for the future.    We can follow-up as needed for this and other issues.    It was a pleasure seeing CC today.    Celso Eagle DO, [facility]  Primary Care Sports Medicine      This note was constructed using Dragon dictation software, please excuse any minor errors in spelling, grammar, or syntax.            Celso Eagle DO

## 2020-10-28 NOTE — PROGRESS NOTES
CHIEF COMPLAINT:  Covid 19 Testing (RTP following positive COVID-19 test)       HISTORY OF PRESENT ILLNESS  Ms. 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  CAREY is doing quite well.  With exception of a fever early in her course she was asymptomatic throughout, she is asymptomatic today.  No current complaints or concerns.  CAREY has completed her cardiac testing.      Additional history: as documented    MEDICAL HISTORY  There is no problem list on file for this patient.      Current Outpatient Medications   Medication Sig Dispense Refill     ferrous sulfate (SLO-FE) 142 (45 Fe) MG CR tablet Take 1 tablet (142 mg) by mouth daily 93 tablet 1     sertraline (ZOLOFT) 50 MG tablet Take 2 tablets (100 mg) by mouth daily 186 tablet 1       No Known Allergies    No family history on file.    Additional medical/Social/Surgical histories reviewed in [facility] and updated as appropriate.        PHYSICAL EXAM    General: healthy, alert and no distress, no deformities, normal attention to grooming  HEENT: conjunctivae not injected, moist mucous membranes  Pulm: lungs clear bilaterally, normal effort  CV: HRRR, no murmur, normal peripheral perfusion  Musculoskeletal: normal gait  Neuro: mentation intact, speech is normal  Psych: normal affect         ASSESSMENT & PLAN  Ms. 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    I reviewed her cardiac testing in the room with her.  CAREY has a normal and reassuring EKG, echocardiogram, troponin, and cardiac MRI.    At this point CAREY can begin the return to play protocol.  This will be supervised by her , Maximiliano    We did discuss the continued need for wearing a mask, handwashing, and social distancing, as there is the possibility that the disease can still be transmitted.  We also discussed the relative unknown with regards to antibody  and immunity status for the future.    We can follow-up as needed for this and other issues.    It was a pleasure seeing CC today.    Celso Eagle DO, [facility]  Primary Care Sports Medicine      This note was constructed using Dragon dictation software, please excuse any minor errors in spelling, grammar, or syntax.

## 2020-11-23 DIAGNOSIS — F43.10 PTSD (POST-TRAUMATIC STRESS DISORDER): Primary | ICD-10-CM

## 2020-11-23 RX ORDER — HYDROXYZINE PAMOATE 25 MG/1
CAPSULE ORAL
Qty: 45 CAPSULE | Refills: 0 | Status: SHIPPED | OUTPATIENT
Start: 2020-11-23 | End: 2023-02-16

## 2020-11-25 ENCOUNTER — DOCUMENTATION ONLY (OUTPATIENT)
Dept: FAMILY MEDICINE | Facility: CLINIC | Age: 20
End: 2020-11-25
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
## 2020-12-04 DIAGNOSIS — F43.10 PTSD (POST-TRAUMATIC STRESS DISORDER): ICD-10-CM

## 2020-12-04 DIAGNOSIS — F41.1 GAD (GENERALIZED ANXIETY DISORDER): ICD-10-CM

## 2020-12-07 NOTE — TELEPHONE ENCOUNTER
hydrOXYzine (VISTARIL) 25 MG capsule    Take 1-2 pills by mouth one hour prior to bedtime and PRN anxiety up to 3x/day total    Last Written Prescription Date:  11/23/20  Last Fill Quantity: 45,   # refills: 0  Last Office Visit : 10/28/20  Future Office visit: none    Routing refill request to provider for review/approval because:  Deferred to Clinic RN for review/ approval. Not mentioned at last visit.

## 2020-12-09 RX ORDER — HYDROXYZINE PAMOATE 25 MG/1
CAPSULE ORAL
Qty: 270 CAPSULE | Refills: 0 | Status: CANCELLED | OUTPATIENT
Start: 2020-12-09

## 2020-12-10 DIAGNOSIS — E61.1 IRON DEFICIENCY: ICD-10-CM

## 2020-12-14 DIAGNOSIS — F41.1 GAD (GENERALIZED ANXIETY DISORDER): ICD-10-CM

## 2020-12-14 RX ORDER — SERTRALINE HYDROCHLORIDE 100 MG/1
100 TABLET, FILM COATED ORAL DAILY
Qty: 90 TABLET | Refills: 1 | Status: CANCELLED | OUTPATIENT
Start: 2020-12-14

## 2021-01-20 ENCOUNTER — OFFICE VISIT (OUTPATIENT)
Dept: ORTHOPEDICS | Facility: CLINIC | Age: 21
End: 2021-01-20
Payer: COMMERCIAL

## 2021-01-20 DIAGNOSIS — M25.511 PAIN IN JOINT OF RIGHT SHOULDER: Primary | ICD-10-CM

## 2021-01-20 NOTE — LETTER
Date:February 3, 2021      Patient was self referred, no letter generated. Do not send.        [facility] Health Information
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
## 2021-01-26 ENCOUNTER — OFFICE VISIT (OUTPATIENT)
Dept: FAMILY MEDICINE | Facility: CLINIC | Age: 21
End: 2021-01-26
Payer: COMMERCIAL

## 2021-01-26 VITALS
WEIGHT: 139.2 LBS | HEART RATE: 74 BPM | HEIGHT: 69 IN | SYSTOLIC BLOOD PRESSURE: 110 MMHG | BODY MASS INDEX: 20.62 KG/M2 | DIASTOLIC BLOOD PRESSURE: 57 MMHG

## 2021-01-26 DIAGNOSIS — F41.1 GAD (GENERALIZED ANXIETY DISORDER): Primary | ICD-10-CM

## 2021-01-26 DIAGNOSIS — F43.10 PTSD (POST-TRAUMATIC STRESS DISORDER): ICD-10-CM

## 2021-01-26 ASSESSMENT — ANXIETY QUESTIONNAIRES
2. NOT BEING ABLE TO STOP OR CONTROL WORRYING: SEVERAL DAYS
IF YOU CHECKED OFF ANY PROBLEMS ON THIS QUESTIONNAIRE, HOW DIFFICULT HAVE THESE PROBLEMS MADE IT FOR YOU TO DO YOUR WORK, TAKE CARE OF THINGS AT HOME, OR GET ALONG WITH OTHER PEOPLE: SOMEWHAT DIFFICULT
3. WORRYING TOO MUCH ABOUT DIFFERENT THINGS: NOT AT ALL
6. BECOMING EASILY ANNOYED OR IRRITABLE: SEVERAL DAYS
1. FEELING NERVOUS, ANXIOUS, OR ON EDGE: SEVERAL DAYS
GAD7 TOTAL SCORE: 4
5. BEING SO RESTLESS THAT IT IS HARD TO SIT STILL: NOT AT ALL
7. FEELING AFRAID AS IF SOMETHING AWFUL MIGHT HAPPEN: NOT AT ALL

## 2021-01-26 ASSESSMENT — PATIENT HEALTH QUESTIONNAIRE - PHQ9
5. POOR APPETITE OR OVEREATING: SEVERAL DAYS
SUM OF ALL RESPONSES TO PHQ QUESTIONS 1-9: 7

## 2021-01-26 ASSESSMENT — MIFFLIN-ST. JEOR: SCORE: 1465.79

## 2021-01-26 NOTE — LETTER
Date:January 27, 2021      Patient was self referred, no letter generated. Do not send.        [facility] Health Information
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
## 2021-01-27 ASSESSMENT — ANXIETY QUESTIONNAIRES: GAD7 TOTAL SCORE: 4

## 2021-01-28 NOTE — PROGRESS NOTES
Service Date: 2021      CHIEF COMPLAINT:  Right shoulder pain.      HISTORY OF PRESENT ILLNESS:  Adrianna Otoole is a 20-year-old [place]  who had a relatively recent right shoulder injury.  She felt her shoulder pop.  She did not have a history of instability.  She complains of pain with rotation.  She is able to practice.  She has no remote history of instability.      PHYSICAL EXAMINATION:  20-year-old female, alert and oriented, in no apparent distress.  Cervical spine nontender, full range of motion, negative Spurling.  Evaluation of the shoulder girdle reveals no AC joint tenderness.  Slight tenderness in the bicipital groove.  Range of motion is full and symmetrical.  She has a positive [place].  Positive crank test.  No apprehension.  No posterior apprehension.  She has a negative load and shift.      IMPRESSION:  20-year-old female with right shoulder pain, most consistent with superior labral pathology.  We had a long conversation today regarding treatment options.  We discussed rehabilitation.  We discussed injections.  I offered to discuss imaging.  At this point, Adrianna would like to wait on getting an MRI.  This is something we may want to consider if her symptoms worsen.  We discussed an injection to help with pain if she remains symptomatic.      PLAN:   1.  The patient will start a rehabilitation program with her .  She can use over-the-counter anti-inflammatory medications for discomfort.   2.  I would like to see her back at the end of the season for a routine recheck.   3.  If her symptoms worsen, we would go ahead and order a right shoulder MRI.  We may do this with gadolinium depending on the timing around her game play.         ROX EDWARDS MD             D: 2021   T: 2021   MT: milo      Name:     ASHER OTOOLE   MRN:      [id number]        Account:      [id number]   :      2000           Service Date: 2021      Document:  [id number]

## 2021-02-06 ENCOUNTER — DOCUMENTATION ONLY (OUTPATIENT)
Dept: FAMILY MEDICINE | Facility: CLINIC | Age: 21
End: 2021-02-06

## 2021-02-06 NOTE — PROGRESS NOTES
[facility]  Nikolas ATC initial assessment note  Date of service performed: 02/05/2021    Concern: Acute illness  Body part: GI  Description: N/A  Injury: N/A  Type: Non-athletics related  Date of injury: 02/05/2021    S: Pt c/o nausea during the volleyball match at [facility] yesterday.  Pt stated at the beginning of the match that she had not eaten much food that day.  Pt played in the first 3 sets and was given fluids, electrolytes, apple sauce and Nelida bars throughout the first 3 sets.  During the break in between the 3rd & 4th sets, I decided to remove her from the match.  We moved to an enclosed area off the court and pt began dry-heaving.  She stated that she felt very hot and had cramping pain in her stomach.  Pt states she is currently having her period and denies pregnancy.  The team physician for [facility] was called and talked to myself and CC.  He suspected a GI virus and gave her an anti-diarrheal medication and an anti-nausea medication (Ondansetron). We moved CC to the bus and took her back to the hotel, Therese Alonso accompanied her.    Once back at the hotel, CC took a shower.  I took her temperature after her shower and it was 97.8 F.  She was given the Ondansetron and put to bed.  She woke up at 4 AM with dry-heaving and called me to her room.  I gave her another Ondansetron at that time and she went to sleep.  Pt came to COVID antigen testing with the team this morning and tested negative.  She returned to the hotel to rest.    P: Push fluids and bland diet as tolerated.  [facility] team physician was going to call this morning to check-in and discuss possible IV fluids.    CJ Arriaga, ATC

## 2021-02-09 ENCOUNTER — VIRTUAL VISIT (OUTPATIENT)
Dept: FAMILY MEDICINE | Facility: CLINIC | Age: 21
End: 2021-02-09
Payer: COMMERCIAL

## 2021-02-09 VITALS
WEIGHT: 139.8 LBS | HEART RATE: 96 BPM | SYSTOLIC BLOOD PRESSURE: 146 MMHG | DIASTOLIC BLOOD PRESSURE: 72 MMHG | BODY MASS INDEX: 20.71 KG/M2 | TEMPERATURE: 98.3 F | HEIGHT: 69 IN

## 2021-02-09 DIAGNOSIS — A08.4 VIRAL GASTROENTERITIS: Primary | ICD-10-CM

## 2021-02-09 DIAGNOSIS — F41.1 GAD (GENERALIZED ANXIETY DISORDER): ICD-10-CM

## 2021-02-09 DIAGNOSIS — F43.10 PTSD (POST-TRAUMATIC STRESS DISORDER): ICD-10-CM

## 2021-02-09 ASSESSMENT — ANXIETY QUESTIONNAIRES
1. FEELING NERVOUS, ANXIOUS, OR ON EDGE: NEARLY EVERY DAY
7. FEELING AFRAID AS IF SOMETHING AWFUL MIGHT HAPPEN: SEVERAL DAYS
2. NOT BEING ABLE TO STOP OR CONTROL WORRYING: SEVERAL DAYS
IF YOU CHECKED OFF ANY PROBLEMS ON THIS QUESTIONNAIRE, HOW DIFFICULT HAVE THESE PROBLEMS MADE IT FOR YOU TO DO YOUR WORK, TAKE CARE OF THINGS AT HOME, OR GET ALONG WITH OTHER PEOPLE: SOMEWHAT DIFFICULT
6. BECOMING EASILY ANNOYED OR IRRITABLE: SEVERAL DAYS
GAD7 TOTAL SCORE: 11
5. BEING SO RESTLESS THAT IT IS HARD TO SIT STILL: SEVERAL DAYS
3. WORRYING TOO MUCH ABOUT DIFFERENT THINGS: MORE THAN HALF THE DAYS

## 2021-02-09 ASSESSMENT — MIFFLIN-ST. JEOR: SCORE: 1468.51

## 2021-02-09 ASSESSMENT — PATIENT HEALTH QUESTIONNAIRE - PHQ9
SUM OF ALL RESPONSES TO PHQ QUESTIONS 1-9: 14
5. POOR APPETITE OR OVEREATING: MORE THAN HALF THE DAYS
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
## 2021-02-09 NOTE — LETTER
Date:February 17, 2021      Patient was self referred, no letter generated. Do not send.        [facility] Health Information

## 2021-02-10 ASSESSMENT — ANXIETY QUESTIONNAIRES: GAD7 TOTAL SCORE: 11
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
## 2021-02-19 ENCOUNTER — TRANSFERRED RECORDS (OUTPATIENT)
Dept: HEALTH INFORMATION MANAGEMENT | Facility: CLINIC | Age: 21
End: 2021-02-19

## 2021-02-25 DIAGNOSIS — F41.1 GAD (GENERALIZED ANXIETY DISORDER): ICD-10-CM

## 2021-02-25 RX ORDER — SERTRALINE HYDROCHLORIDE 100 MG/1
100 TABLET, FILM COATED ORAL DAILY
Qty: 93 TABLET | Refills: 0 | Status: SHIPPED | OUTPATIENT
Start: 2021-02-25 | End: 2021-05-20

## 2021-02-28 ENCOUNTER — OFFICE VISIT (OUTPATIENT)
Dept: URGENT CARE | Facility: URGENT CARE | Age: 21
End: 2021-02-28
Payer: COMMERCIAL

## 2021-02-28 VITALS — SYSTOLIC BLOOD PRESSURE: 109 MMHG | DIASTOLIC BLOOD PRESSURE: 63 MMHG | TEMPERATURE: 97.8 F | HEART RATE: 56 BPM

## 2021-02-28 DIAGNOSIS — N30.01 ACUTE CYSTITIS WITH HEMATURIA: Primary | ICD-10-CM

## 2021-02-28 DIAGNOSIS — R30.0 DYSURIA: ICD-10-CM

## 2021-02-28 LAB

## 2021-02-28 PROCEDURE — 87186 SC STD MICRODIL/AGAR DIL: CPT | Performed by: NURSE PRACTITIONER

## 2021-02-28 PROCEDURE — 87086 URINE CULTURE/COLONY COUNT: CPT | Performed by: NURSE PRACTITIONER

## 2021-02-28 PROCEDURE — 87088 URINE BACTERIA CULTURE: CPT | Performed by: NURSE PRACTITIONER

## 2021-02-28 PROCEDURE — 81001 URINALYSIS AUTO W/SCOPE: CPT | Performed by: NURSE PRACTITIONER

## 2021-02-28 PROCEDURE — 99213 OFFICE O/P EST LOW 20 MIN: CPT | Performed by: NURSE PRACTITIONER

## 2021-02-28 RX ORDER — INFLUENZA A VIRUS A/NEBRASKA/14/2019 (H1N1) ANTIGEN (MDCK CELL DERIVED, PROPIOLACTONE INACTIVATED), INFLUENZA A VIRUS A/DELAWARE/39/2019 (H3N2) ANTIGEN (MDCK CELL DERIVED, PROPIOLACTONE INACTIVATED), INFLUENZA B VIRUS B/SINGAPORE/INFTT-16-0610/2016 ANTIGEN (MDCK CELL DERIVED, PROPIOLACTONE INACTIVATED), INFLUENZA B VIRUS B/DARWIN/7/2019 ANTIGEN (MDCK CELL DERIVED, PROPIOLACTONE INACTIVATED) 15; 15; 15; 15 UG/.5ML; UG/.5ML; UG/.5ML; UG/.5ML
INJECTION, SUSPENSION INTRAMUSCULAR
COMMUNITY
Start: 2020-11-03 | End: 2022-12-13

## 2021-02-28 RX ORDER — SPIRONOLACTONE 50 MG/1
TABLET, FILM COATED ORAL
COMMUNITY
Start: 2020-11-27 | End: 2022-12-13

## 2021-02-28 RX ORDER — NITROFURANTOIN 25; 75 MG/1; MG/1
100 CAPSULE ORAL 2 TIMES DAILY
Qty: 10 CAPSULE | Refills: 0 | Status: SHIPPED | OUTPATIENT
Start: 2021-02-28 | End: 2021-03-05

## 2021-02-28 NOTE — PROGRESS NOTES
Chief Complaint   Patient presents with     Urgent Care     burning pain when urinating, urinary frequency, cloudy/blood in urine since yesterday. (urine x 2 collected)         ICD-10-CM    1. Acute cystitis with hematuria  N30.01 nitroFURantoin macrocrystal-monohydrate (MACROBID) 100 MG capsule   2. Dysuria  R30.0 UA with Microscopic reflex to Culture     Urine Culture Aerobic Bacterial     nitroFURantoin macrocrystal-monohydrate (MACROBID) 100 MG capsule   Straightforward urinary tract infection.  Will treat with antibiotics.  Educated patient in ways to prevent future infections.      Medical Decision Making    Differential Diagnosis:  UTI: UTI, Dysuria, Pyelonephritis, Kidney Stone, Vaginitis and STD      Results for orders placed or performed in visit on 02/28/21 (from the past 24 hour(s))   UA with Microscopic reflex to Culture    Specimen: Midstream Urine   Result Value Ref Range    Color Urine Yellow     Appearance Urine Clear     Glucose Urine Negative NEG^Negative mg/dL    Bilirubin Urine Negative NEG^Negative    Ketones Urine Negative NEG^Negative mg/dL    Specific Gravity Urine 1.020 1.003 - 1.035    pH Urine 6.0 5.0 - 7.0 pH    Protein Albumin Urine Negative NEG^Negative mg/dL    Urobilinogen Urine 0.2 0.2 - 1.0 EU/dL    Nitrite Urine Negative NEG^Negative    Blood Urine Moderate (A) NEG^Negative    Leukocyte Esterase Urine Moderate (A) NEG^Negative    Source Midstream Urine     WBC Urine 25-50 (A) OTO5^0 - 5 /HPF    RBC Urine 10-25 (A) OTO2^O - 2 /HPF    Squamous Epithelial /LPF Urine Few FEW^Few /LPF    Bacteria Urine Moderate (A) NEG^Negative /HPF       Subjective     Smitha Seaman is an 21 year oldfemale who presents to clinic today for urinary pressure, dysuria that started yesterday. When she awoke this morning symptoms were worse. Denies any fever, nausea or vomiting.        Objective    /63   Pulse 56   Temp 97.8  F (36.6  C) (Temporal)     Physical Exam       GENERAL APPEARANCE:  healthy appearing, alert     ABDOMEN:  soft, nontender, no HSM or masses and bowel sounds normal, no CVA tenderness     MS: extremities normal- no gross deformities noted; normal muscle tone.     SKIN: no suspicious lesions or rashes    Patient Instructions   PREVENTION OF URINARY TRACT INFECTION    AVOID CHEMICAL IRRITTANTS: bath gels,  Perfumed products,  Deoderant pads or tampons, douching    CLOTHING that increases moisture and bacterial growth:  Nylon, lycra, pantihose, pantiliners     AVOID: tight clothing and thongs    ACIDIFY URINE: cranberry tablets instead of cranberry juice (with excess sugar) to acidify urine and decrease bacterial growth.     URINATE after intercourse    FLUIDS: 6-8 glasses water per day    Patient Education     Bladder Infection, Female (Adult)     Urine normally doesn't have any germs (bacteria) in it. But bacteria can get into the urinary tract from the skin around the rectum. Or they can travel in the blood from other parts of the body. Once they are in your urinary tract, they can cause infection in these areas:    The urethra (urethritis)    The bladder (cystitis)    The kidneys (pyelonephritis)  The most common place for an infection is in the bladder. This is called a bladder infection. This is one of the most common infections in women. Most bladder infections are easily treated. They are not serious unless the infection spreads to the kidney.  The terms bladder infection, UTI, and cystitis are often used to describe the same thing. But they are not always the same. Cystitis is an inflammation of the bladder. The most common cause of cystitis is an infection.  Symptoms  The infection causes inflammation in the urethra and bladder. This causes many of the symptoms. The most common symptoms of a bladder infection are:    Pain or burning when urinating    Having to urinate more often than normal    Urgent need to urinate    Only a small amount of urine comes out    Blood in  urine    Belly (abdominal) discomfort. This is often in the lower belly above the pubic bone.    Cloudy urine    Strong- or bad-smelling urine    Unable to urinate (urinary retention)    Unable to hold urine in (urinary incontinence)    Fever    Loss of appetite    Confusion (in older adults)  Causes  Bladder infections are not contagious. You can't get one from someone else, from a toilet seat, or from sharing a bath.  The most common cause of bladder infections is bacteria from the bowels. The bacteria get onto the skin around the opening of the urethra. From there, they can get into the urine. Then they travel up to the bladder, causing inflammation and infection. This often happens because of:    Wiping incorrectly after urinating. Always wipe from front to back.    Bowel incontinence    Pregnancy    Procedures such as having a catheter put in    Older age    Not emptying your bladder. This can give bacteria a chance to grow in your urine.    Fluid loss (dehydration)    Constipation    Having sex    Using a diaphragm for birth control   Treatment  Bladder infections are diagnosed by a urine test and urine culture. They are treated with antibiotics. They often clear up quickly without problems. Treatment helps prevent a more serious kidney infection.  Medicines  Medicines can help in the treatment of a bladder infection:    Take antibiotics until they are used up, even if you feel better. It's important to finish them to make sure the infection has cleared.    You can use acetaminophen or ibuprofen for pain, fever, or discomfort, unless another medicine was prescribed. If you have long-term (chronic) liver or kidney disease, talk with your healthcare provider before using these medicines. Also talk with your provider if you've ever had a stomach ulcer or GI (gastrointestinal) bleeding, or are taking blood-thinner medicines.    If you are given phenazopydridine to reduce burning with urination, it will make your  urine a bright orange color. This can stain clothing.  Care and prevention  These self-care steps can help prevent future infections:    Drink plenty of fluids. This helps to prevent dehydration and flush out your bladder. Do this unless you must restrict fluids for other health reasons, or your healthcare provider told you not to.    Clean yourself correctly after going to the bathroom. Wipe from front to back after using the toilet. This helps prevent the spread of bacteria.    Urinate more often. Don't try to hold urine in for a long time.    Wear loose-fitting clothes and cotton underwear. Don't wear tight-fitting pants.    Improve your diet and prevent constipation. Eat more fresh fruits and vegetables, and fiber. Eat less junk foods and fatty foods.    Don't have sex until your symptoms are gone.    Don't have caffeine, alcohol, and spicy foods. These can irritate your bladder.    Urinate right after you have sex to flush out your bladder.    If you use birth control pills and have frequent bladder infections, discuss it with your healthcare provider.  Follow-up care  Call your healthcare provider if all symptoms are not gone after 3 days of treatment. This is especially important if you have repeat infections.  If a culture was done, you will be told if your treatment needs to be changed. If directed, you can call to find out the results.  If X-rays were done, you will be told if the results will affect your treatment.  Call 911  Call 911 if any of the following occur:    Trouble breathing    Hard to wake up or confusion    Fainting (loss of consciousness)    Fast heart rate  When to get medical advice  Call your healthcare provider right away if any of these occur:    Fever of 100.4 F (38.0 C) or higher, or as directed by your healthcare provider    Symptoms are not better after 3 days of treatment    Back or belly pain that gets worse    Repeated vomiting, or unable to keep medicine down    Weakness or  dizziness    Vaginal discharge    Pain, redness, or swelling in the outer vaginal area (labia)  [employer] last reviewed this educational content on 11/1/2019 2000-2020 [employer]. [address] [place] [address], [place] [address]. All rights reserved. This information is not intended as a substitute for professional medical care. Always follow your healthcare professional's instructions.               JODY Esquivel, CNP  [facility] Provider

## 2021-02-28 NOTE — PATIENT INSTRUCTIONS
PREVENTION OF URINARY TRACT INFECTION    AVOID CHEMICAL IRRITTANTS: bath gels,  Perfumed products,  Deoderant pads or tampons, douching    CLOTHING that increases moisture and bacterial growth:  Nylon, lycra, pantihose, pantiliners     AVOID: tight clothing and thongs    ACIDIFY URINE: cranberry tablets instead of cranberry juice (with excess sugar) to acidify urine and decrease bacterial growth.     URINATE after intercourse    FLUIDS: 6-8 glasses water per day    Patient Education     Bladder Infection, Female (Adult)     Urine normally doesn't have any germs (bacteria) in it. But bacteria can get into the urinary tract from the skin around the rectum. Or they can travel in the blood from other parts of the body. Once they are in your urinary tract, they can cause infection in these areas:    The urethra (urethritis)    The bladder (cystitis)    The kidneys (pyelonephritis)  The most common place for an infection is in the bladder. This is called a bladder infection. This is one of the most common infections in women. Most bladder infections are easily treated. They are not serious unless the infection spreads to the kidney.  The terms bladder infection, UTI, and cystitis are often used to describe the same thing. But they are not always the same. Cystitis is an inflammation of the bladder. The most common cause of cystitis is an infection.  Symptoms  The infection causes inflammation in the urethra and bladder. This causes many of the symptoms. The most common symptoms of a bladder infection are:    Pain or burning when urinating    Having to urinate more often than normal    Urgent need to urinate    Only a small amount of urine comes out    Blood in urine    Belly (abdominal) discomfort. This is often in the lower belly above the pubic bone.    Cloudy urine    Strong- or bad-smelling urine    Unable to urinate (urinary retention)    Unable to hold urine in (urinary incontinence)    Fever    Loss of  appetite    Confusion (in older adults)  Causes  Bladder infections are not contagious. You can't get one from someone else, from a toilet seat, or from sharing a bath.  The most common cause of bladder infections is bacteria from the bowels. The bacteria get onto the skin around the opening of the urethra. From there, they can get into the urine. Then they travel up to the bladder, causing inflammation and infection. This often happens because of:    Wiping incorrectly after urinating. Always wipe from front to back.    Bowel incontinence    Pregnancy    Procedures such as having a catheter put in    Older age    Not emptying your bladder. This can give bacteria a chance to grow in your urine.    Fluid loss (dehydration)    Constipation    Having sex    Using a diaphragm for birth control   Treatment  Bladder infections are diagnosed by a urine test and urine culture. They are treated with antibiotics. They often clear up quickly without problems. Treatment helps prevent a more serious kidney infection.  Medicines  Medicines can help in the treatment of a bladder infection:    Take antibiotics until they are used up, even if you feel better. It's important to finish them to make sure the infection has cleared.    You can use acetaminophen or ibuprofen for pain, fever, or discomfort, unless another medicine was prescribed. If you have long-term (chronic) liver or kidney disease, talk with your healthcare provider before using these medicines. Also talk with your provider if you've ever had a stomach ulcer or GI (gastrointestinal) bleeding, or are taking blood-thinner medicines.    If you are given phenazopydridine to reduce burning with urination, it will make your urine a bright orange color. This can stain clothing.  Care and prevention  These self-care steps can help prevent future infections:    Drink plenty of fluids. This helps to prevent dehydration and flush out your bladder. Do this unless you must restrict  fluids for other health reasons, or your healthcare provider told you not to.    Clean yourself correctly after going to the bathroom. Wipe from front to back after using the toilet. This helps prevent the spread of bacteria.    Urinate more often. Don't try to hold urine in for a long time.    Wear loose-fitting clothes and cotton underwear. Don't wear tight-fitting pants.    Improve your diet and prevent constipation. Eat more fresh fruits and vegetables, and fiber. Eat less junk foods and fatty foods.    Don't have sex until your symptoms are gone.    Don't have caffeine, alcohol, and spicy foods. These can irritate your bladder.    Urinate right after you have sex to flush out your bladder.    If you use birth control pills and have frequent bladder infections, discuss it with your healthcare provider.  Follow-up care  Call your healthcare provider if all symptoms are not gone after 3 days of treatment. This is especially important if you have repeat infections.  If a culture was done, you will be told if your treatment needs to be changed. If directed, you can call to find out the results.  If X-rays were done, you will be told if the results will affect your treatment.  Call 911  Call 911 if any of the following occur:    Trouble breathing    Hard to wake up or confusion    Fainting (loss of consciousness)    Fast heart rate  When to get medical advice  Call your healthcare provider right away if any of these occur:    Fever of 100.4 F (38.0 C) or higher, or as directed by your healthcare provider    Symptoms are not better after 3 days of treatment    Back or belly pain that gets worse    Repeated vomiting, or unable to keep medicine down    Weakness or dizziness    Vaginal discharge    Pain, redness, or swelling in the outer vaginal area (labia)  [employer] last reviewed this educational content on 11/1/2019 2000-2020 [employer]. [address], [place] [address]. All rights reserved.  This information is not intended as a substitute for professional medical care. Always follow your healthcare professional's instructions.

## 2021-03-01 ENCOUNTER — OFFICE VISIT (OUTPATIENT)
Dept: FAMILY MEDICINE | Facility: CLINIC | Age: 21
End: 2021-03-01
Payer: COMMERCIAL

## 2021-03-01 VITALS
BODY MASS INDEX: 20.71 KG/M2 | WEIGHT: 139.8 LBS | SYSTOLIC BLOOD PRESSURE: 120 MMHG | HEIGHT: 69 IN | HEART RATE: 62 BPM | DIASTOLIC BLOOD PRESSURE: 75 MMHG

## 2021-03-01 DIAGNOSIS — F43.10 PTSD (POST-TRAUMATIC STRESS DISORDER): ICD-10-CM

## 2021-03-01 DIAGNOSIS — F41.1 GAD (GENERALIZED ANXIETY DISORDER): Primary | ICD-10-CM

## 2021-03-01 ASSESSMENT — ANXIETY QUESTIONNAIRES
IF YOU CHECKED OFF ANY PROBLEMS ON THIS QUESTIONNAIRE, HOW DIFFICULT HAVE THESE PROBLEMS MADE IT FOR YOU TO DO YOUR WORK, TAKE CARE OF THINGS AT HOME, OR GET ALONG WITH OTHER PEOPLE: NOT DIFFICULT AT ALL
7. FEELING AFRAID AS IF SOMETHING AWFUL MIGHT HAPPEN: NOT AT ALL
5. BEING SO RESTLESS THAT IT IS HARD TO SIT STILL: NOT AT ALL
GAD7 TOTAL SCORE: 5
6. BECOMING EASILY ANNOYED OR IRRITABLE: MORE THAN HALF THE DAYS
2. NOT BEING ABLE TO STOP OR CONTROL WORRYING: SEVERAL DAYS
1. FEELING NERVOUS, ANXIOUS, OR ON EDGE: SEVERAL DAYS
3. WORRYING TOO MUCH ABOUT DIFFERENT THINGS: SEVERAL DAYS

## 2021-03-01 ASSESSMENT — PATIENT HEALTH QUESTIONNAIRE - PHQ9
SUM OF ALL RESPONSES TO PHQ QUESTIONS 1-9: 9
5. POOR APPETITE OR OVEREATING: NOT AT ALL

## 2021-03-01 ASSESSMENT — MIFFLIN-ST. JEOR: SCORE: 1463.51

## 2021-03-01 NOTE — PROGRESS NOTES
Attending Note:   I have personally examined this patient and have reviewed the clinical presentation and progress note with the fellow. I agree with the treatment plan as outlined. The plan was formulated with the fellow on the day of the patient's visit.   Janna Caba MD, CAQ, CCD  [facility]  Sports Medicine and Bone Health
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
## 2021-03-02 ASSESSMENT — ANXIETY QUESTIONNAIRES: GAD7 TOTAL SCORE: 5

## 2021-03-03 LAB
BACTERIA SPEC CULT: ABNORMAL
SPECIMEN SOURCE: ABNORMAL

## 2021-03-11 ENCOUNTER — TRANSFERRED RECORDS (OUTPATIENT)
Dept: HEALTH INFORMATION MANAGEMENT | Facility: CLINIC | Age: 21
End: 2021-03-11

## 2021-03-13 ENCOUNTER — OFFICE VISIT (OUTPATIENT)
Dept: ORTHOPEDICS | Facility: CLINIC | Age: 21
End: 2021-03-13
Payer: COMMERCIAL

## 2021-03-13 DIAGNOSIS — S89.92XA KNEE INJURIES, LEFT, INITIAL ENCOUNTER: Primary | ICD-10-CM

## 2021-03-13 DIAGNOSIS — M25.562 ACUTE PAIN OF LEFT KNEE: Primary | ICD-10-CM

## 2021-03-13 NOTE — LETTER
"  3/13/2021       RE: Smitha Seaman  [address] [facility] [address]       Initial Visit     Referring MD: No ref. provider found     CC: Left knee injury     HPI: Smitha Seaman is a 21 year old female who presents with left knee injury. 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 She felt that her knee went \"forward\" and she felt a pop. There was an initial feeling of pain, but at the time of exam immediately after the injury, she did not feel much pain. She notes that the knee just felt different.    PMH: There is no problem list on file for this patient.       PSH:   Past Surgical History:   Procedure Laterality Date     ORTHOPEDIC SURGERY      bilateral ankle surgeries        Medications:   Current Outpatient Medications   Medication     ferrous sulfate (SLO-FE) 142 (45 Fe) MG CR tablet     FLUCELVAX QUADRIVALENT 0.5 ML ELAINE     hydrOXYzine (VISTARIL) 25 MG capsule     levonorgestrel (MIRENA) 20 MCG/24HR IUD     sertraline (ZOLOFT) 100 MG tablet     spironolactone (ALDACTONE) 50 MG tablet     No current facility-administered medications for this visit.         Allergies: No Known Allergies     SH:   Social History     Occupational History     Not on file   Tobacco Use     Smoking status: Never Smoker     Smokeless tobacco: Never Used   Substance and Sexual Activity     Alcohol use: Yes     Drug use: Never     Sexual activity: Not on file        ROS: General ROS: negative  Respiratory ROS: no cough, shortness of breath, or wheezing  Cardiovascular ROS: no chest pain or dyspnea on exertion     PE:   Gen: A&OX3    Knee       RIGHT   LEFT   Skin:    Intact   Intact   ROM:     0-130   0-130   Effusion:    Neg   Neg   Medial joint line tenderness: Neg   Neg   Lateral joint line tenderness: Neg   Neg   Adrian:    Neg   Neg   Patella crepitus:   Neg   Neg   Patella tenderness:   Neg   Neg   Lachman:    Neg   1A with slight asymmetry to other side  Pivot shift:    Neg   Guarding  Valgus stress: "   Neg   1A  Varus stress:    Neg   Neg   Posterior drawer:   Neg   Neg   N-V     intact   intact   Hip:     nml   nml   Lower extremity edema:  Neg   Neg    TTP on left knee over the medial femoral condyle and mildly on the medial plateau; no obvious TTP in the rest of the knee including lateral condyle and plateau.      MRI:  1. Bone contusion at anterior tibia, likely direct given no associated  opposing bony edema in the femur.   2. Low grade lateral retinacular sprain. No evidence of recent  transient patellar dislocation/dislocation injury.  3. Mild subcutaneous edema anterior medial soft tissue below knee,  likely related to area of contusion.  4. Menisci, cruciate ligaments and supporting structures are intact.  5. Bipartite patella with opposing edema-like marrow signal intensity.    I have personally reviewed the imaging.       Impression:   21 year old female with acute left knee injury     Plan:   The exam was concerning for possible MCL or ACL injury, but the MRI is reassuring.   She will work with training staff and gradually work back into activity based on symptoms.  May consider bracing if it helps for comfort.      cc:   Referring provider   [unfilled]     Primary care provider   [address] [place] [address]         Saurabh Veras MD

## 2021-03-14 ENCOUNTER — ANCILLARY PROCEDURE (OUTPATIENT)
Dept: MRI IMAGING | Facility: CLINIC | Age: 21
End: 2021-03-14
Attending: ORTHOPAEDIC SURGERY
Payer: COMMERCIAL

## 2021-03-14 DIAGNOSIS — M25.562 ACUTE PAIN OF LEFT KNEE: ICD-10-CM

## 2021-03-14 PROCEDURE — 73721 MRI JNT OF LWR EXTRE W/O DYE: CPT | Mod: LT | Performed by: RADIOLOGY

## 2021-03-14 NOTE — PROGRESS NOTES
"Initial Visit     Referring MD: No ref. provider found     CC: Left knee injury     HPI: Smitha Seaman is a 21 year old female who presents with left knee injury. 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 She felt that her knee went \"forward\" and she felt a pop. There was an initial feeling of pain, but at the time of exam immediately after the injury, she did not feel much pain. She notes that the knee just felt different.    PMH: There is no problem list on file for this patient.       PSH:   Past Surgical History:   Procedure Laterality Date     ORTHOPEDIC SURGERY      bilateral ankle surgeries        Medications:   Current Outpatient Medications   Medication     ferrous sulfate (SLO-FE) 142 (45 Fe) MG CR tablet     FLUCELVAX QUADRIVALENT 0.5 ML ELAINE     hydrOXYzine (VISTARIL) 25 MG capsule     levonorgestrel (MIRENA) 20 MCG/24HR IUD     sertraline (ZOLOFT) 100 MG tablet     spironolactone (ALDACTONE) 50 MG tablet     No current facility-administered medications for this visit.         Allergies: No Known Allergies     SH:   Social History     Occupational History     Not on file   Tobacco Use     Smoking status: Never Smoker     Smokeless tobacco: Never Used   Substance and Sexual Activity     Alcohol use: Yes     Drug use: Never     Sexual activity: Not on file        ROS: General ROS: negative  Respiratory ROS: no cough, shortness of breath, or wheezing  Cardiovascular ROS: no chest pain or dyspnea on exertion     PE:   Gen: A&OX3    Knee       RIGHT   LEFT   Skin:    Intact   Intact   ROM:     0-130   0-130   Effusion:    Neg   Neg   Medial joint line tenderness: Neg   Neg   Lateral joint line tenderness: Neg   Neg   Adrian:    Neg   Neg   Patella crepitus:   Neg   Neg   Patella tenderness:   Neg   Neg   Lachman:    Neg   1A with slight asymmetry to other side  Pivot shift:    Neg   Guarding  Valgus stress:   Neg   1A  Varus stress:    Neg   Neg   Posterior drawer:   Neg   Neg   N-V     intact "   intact   Hip:     nml   nml   Lower extremity edema:  Neg   Neg    TTP on left knee over the medial femoral condyle and mildly on the medial plateau; no obvious TTP in the rest of the knee including lateral condyle and plateau.      MRI:  1. Bone contusion at anterior tibia, likely direct given no associated  opposing bony edema in the femur.   2. Low grade lateral retinacular sprain. No evidence of recent  transient patellar dislocation/dislocation injury.  3. Mild subcutaneous edema anterior medial soft tissue below knee,  likely related to area of contusion.  4. Menisci, cruciate ligaments and supporting structures are intact.  5. Bipartite patella with opposing edema-like marrow signal intensity.    I have personally reviewed the imaging.       Impression:   21 year old female with acute left knee injury     Plan:   The exam was concerning for possible MCL or ACL injury, but the MRI is reassuring.   She will work with training staff and gradually work back into activity based on symptoms.  May consider bracing if it helps for comfort.      cc:   Referring provider   [unfilled]     Primary care provider   [address] [facility] [address]

## 2021-03-16 ENCOUNTER — DOCUMENTATION ONLY (OUTPATIENT)
Dept: FAMILY MEDICINE | Facility: CLINIC | Age: 21
End: 2021-03-16

## 2021-03-16 NOTE — CONFIDENTIAL NOTE
"[facility] follow-up note  Date of service performed: 03/23/2021    Concern/injury: L Knee DOI: 03/13/2021    Assessment:  Pt reports to ATR for continued treatment regarding the L knee injury she sustained on 03/13/2021 during a VB game.  Pt states that she is having some \"throbbing\" pain today that she believes is due to her walking around her apartment without her knee brace on for the past 2 days.  Pt states that her pain is significantly reduced when she is wearing the brace.      Plan:  Continue with rehab activity as tolerated.  Pt requested that she be allowed to perform passing and ball control activities in practice this afternoon.  I will monitor her pain and activity and let her proceed with these activities today.      CJ Arriaga, PAMELA        [facility] rehab calendar    Smitha Seaman  Injury requiring rehab: L knee DOI:03/13/2021    Below is the rehab calendar for the week of 03/14/2021.    Sun Mon Tues Wed Thurs Fri Sat     VB off day    MRI   VB off day   Venom knee: 20'    Stationary bike - Level 1: 10'    Compex: 25'    Seated L knee extension w/ VMO: 3x10x3#    AirEx balance ball passing: 3x10 single leg    GameReady hot/cold compression: 35'   Venom knee: 20'    Stationary bike Level 1: 20'    Compex: 25'    Seated reciprocal leg extension: 3x10    1/4 squat walks - lateral: x10    1/4 squat walks - forward/back: x10    Step-ups - bilateral: x15    Rebounder AirEx chest pass: x20    Supine SLR 3-way:  x12 w/ 1#   DJO [place] brace arrived    ~30' passing/ball control     GameReady Hot/Cold compression: x35'   Stationary bike: 20'    kBox RDL's: 3x10    kBox squat: 3/10    kBox split squat R: 3x10    kBox split squat L: 3x10    Slide lunges - [place], side, back: 2x12 each leg    Fitter balance board: 30\" x5    Supine SLR: 2/10/1#    Game Ready Hot/Cold compression: 30'          Maximiliano Arriaga, " "[facility]  [facility] follow-up note  Date of service performed: 03/18/2021    Concern/injury: L knee DOI: 03/13/2021    Assessment:  Pt reports to [facility] this afternoon for her left knee injury sustained on 03/13/2021 during a VB match.  Pt states she has been wearing the brace she received yesterday with ADL's and that it is helping \"a lot\".  We received the other brace, DonJoy [place], today CC wore this to perform some light ball passing and ball control drills.  Pt completed ~30 minutes of that activity then she returned to the [facility] for treatment with GameReady heat/cold compression.  Pt reports that her pain is less with walking in the brace but she is still sore over the anterior tibial plateau in the area when the bone contusion is.  There is no swelling or discoloration present today.    Plan:  Continue with rehab activity as tolerated.  Pt is scheduled to come to the [facility] tomorrow morning at 10 AM.      CJ Arriaga, [facility]  [facility] follow-up note  Date of service performed: 03/17/2021    Concern/injury: L knee DOI: 03/13/2021    Assessment:  Pt reports to [facility] for follow-up care regarding a left knee anterior tibial plateau bone contusion she sustained on 03/13/2021.  Pt states her pain today to be at 3-4/10 with most pain during walking.  Pt denies any new or worsening symptoms.    Pt's left knee is unremarkable today.  There is no sign of edema or discoloration.  She is able to produce a quality quad contraction and perform a straight leg raise with 1# of weight at her ankle.  Pt demonstrates full AROM in flexion and extension.  No noticeable compensation observed with gait activity.    Pt completed all rehab activity without issue.  Pt states she felt quad fatigue at the end of the rehab session.    Two different knee braces were ordered for her yesterday.  One will be used with ADL's and some rehab activity.  This brace " has an extension stop at  10 degrees to protect her from hyperextension.  A [place] brace was ordered and will be delivered over the next week.  We will use this brace as we start returning to VB activity when appropriate.      Plan:  Continue with rehab activity.  Monitor pain and activity level.  F/U with team physician as needed.    CJ Arriaga ATC      [facility] ATHLETICS  [facility] follow-up note  Date of service performed: 03/16/2021    Concern/injury: L knee DOI: 03/13/2021    Assessment: Pt had an MRI on 03/14/2021 and the results indicated a bone contusion over the anterior aspect of the tibial plateau.  Pt has been doing AROM, rest, ice and elevation since Saturday night.  She was able to do nonweight-bearing activity in the WR this morning.    Pt states that she was discomfort with walking and feels a sharp pain in the posterior aspect of her left knee with walking/standing.      Plan:  Pt will continue with rest, ice, compression, elevation for pain.  Start pain-free ROM activity, quad activation and nonweight-bearing, pain-free cardio activity as tolerated.    Pt is not likely to play in the VB matches scheduled for Thursday and Sunday.    F/U with team physician as indicated/needed.    CJ Arriaga ATC

## 2021-03-16 NOTE — PROGRESS NOTES
"[facility] ATHLETICS  [facility] ATC initial assessment note  Date of service performed: 03/13/2021    Concern: Acute injury  Body part: Knee  Description: Left  Injury: Concussion  Type: Athletics related  Date of injury: 03/13/2021    S: Pt sustained a left knee injury during a volleyball match vs. [place] on 03/13/2021 in the 2nd set.  Pt states that she stepped on another players foot and felt her knee \"hyperextend\" and felt \"a pop\".  Pt continued to play for the next several points and then the set ended.  Pt described pain with weight-bearing and states that she knee feels\" loose\".  She was taken into the ATR for exam with team physician Dr. Saurabh Veras.  Pt denies any previous knee injury.    O: Pt was examined by Dr. Saurabh Veras in the [facility] ATR.  See MD note for full dictation.  Pt has no observable edema or discoloration.  She is point tender over the medial anterior aspect of her knee extending down ~1\" on the medial anterior tibial plateau.   She is able to ambulate without assistance.  Pt is able to perform a single leg squat to ~ 45 degrees on her left knee and can perform a straight leg raise without pain.  Pt is able to demonstrate full AROM with pain moving from full flexion into extension.  No catching or locking with movement.  Pt has negative anterior drawer, Lachman's.  Mild sulcus noted with valgus stress over the MCL.      A: R/O ACL, MCL, meniscus injury.  Possible bone contusion involving the medial aspect of the knee joint.    P: MRI Sunday.  F/U with team physician/ after imaging.  Pt was wrapped with a compression warp from ankle to mid-thigh.  She did not want to use crutches and was able to ambulate on her own.  Pt was given a reusable ice pack with extra ice to take home along with ibuprofen for pain PRN.  Pt was instructed to keep knee elevated and can perform AROM activity nonweight-bearing.  Pt understands these instructions.    CJ Viera " Bart, ATC

## 2021-03-21 ENCOUNTER — OFFICE VISIT (OUTPATIENT)
Dept: ORTHOPEDICS | Facility: CLINIC | Age: 21
End: 2021-03-21
Payer: COMMERCIAL

## 2021-03-21 DIAGNOSIS — M25.562 ACUTE PAIN OF LEFT KNEE: Primary | ICD-10-CM

## 2021-03-21 NOTE — LETTER
3/21/2021      RE: Smitha IQBAL Urszula  [address] [facility]  [place] [address]       S: I was able to see CC in the training room 1 week after injury. She notes that she is doing better each day. When she loads the knee she still has some pain medial as well as posterior. She feels that wearing the hinged knee brace helps with the pain. She has not had any feelings of instability. She is ambulating without difficulty and slowly ramping up volleyball activities.    O: Left knee  Grade 1 valgus stress; no laxity on remainder of ligamentous exam   Mild TTP medial plateau and joint line; more notable TTP over the medial femoral condyle; no other TTP about the knee  Full ROM  No effusion  Able to SLR against moderate resistance with no pain or lag, similar to other side  DNVI    A/P:  There has been good improvement in symptoms and exam over past week.  Continue to wear the hinged knee brace.  Increase activity per ATC, Maximiliano, and athlete. We had a discussion about the fact that the timeline for this can be variable, and is based on her symptoms.  F/U prn.      Saurabh Veras MD

## 2021-03-22 NOTE — PROGRESS NOTES
S: I was able to see CC in the training room 1 week after injury. She notes that she is doing better each day. When she loads the knee she still has some pain medial as well as posterior. She feels that wearing the hinged knee brace helps with the pain. She has not had any feelings of instability. She is ambulating without difficulty and slowly ramping up volleyball activities.    O: Left knee  Grade 1 valgus stress; no laxity on remainder of ligamentous exam   Mild TTP medial plateau and joint line; more notable TTP over the medial femoral condyle; no other TTP about the knee  Full ROM  No effusion  Able to SLR against moderate resistance with no pain or lag, similar to other side  DNVI    A/P:  There has been good improvement in symptoms and exam over past week.  Continue to wear the hinged knee brace.  Increase activity per ATC, Maximiliano, and athlete. We had a discussion about the fact that the timeline for this can be variable, and is based on her symptoms.  F/U prn.

## 2021-04-10 ENCOUNTER — MEDICAL CORRESPONDENCE (OUTPATIENT)
Dept: HEALTH INFORMATION MANAGEMENT | Facility: CLINIC | Age: 21
End: 2021-04-10

## 2021-05-19 DIAGNOSIS — F41.1 GAD (GENERALIZED ANXIETY DISORDER): ICD-10-CM

## 2021-05-20 RX ORDER — SERTRALINE HYDROCHLORIDE 100 MG/1
100 TABLET, FILM COATED ORAL DAILY
Qty: 93 TABLET | Refills: 0 | Status: SHIPPED | OUTPATIENT
Start: 2021-05-20 | End: 2021-11-08

## 2021-05-29 ENCOUNTER — HEALTH MAINTENANCE LETTER (OUTPATIENT)
Age: 21
End: 2021-05-29

## 2021-06-24 ENCOUNTER — DOCUMENTATION ONLY (OUTPATIENT)
Dept: FAMILY MEDICINE | Facility: CLINIC | Age: 21
End: 2021-06-24

## 2021-06-24 NOTE — CONFIDENTIAL NOTE
[facility] ATHLETICS  [facility] ATC follow-up note  Date of service performed: 06/24/2021    Concern/injury: Low Back Pain    Assessment:  Pt reported to the [facility] ATR this morning after a scheduled weight-lifting session at [facility] with Therese Alonso.  Pt c/o central low back pain, just superior to SI joint.  Pt states this is similar to pain she has experienced in the past.  Pt denies radicular symptoms in to buttocks/lower extremities and is able to walk without pain.  Pt was in [place], [place] last week 6 days of training with [employer].  Pt states during lifting yesterday, he began to have discomfort in her low back doing dead lifts but did not feel she needed to stop.  Pt states she woke with low back pain this morning and did some recovery rolling out this morning.    I spoke with Dr. Caba on the phone this morning.  She is going to prescribe ibuprofen and a muscle relaxer for CC to use for the next few days.  I have advised CC against any volleyball/weight room activity through this coming Sunday.  CC was advised not to drive while taking the muscle relaxer due to potential drowsiness, she understands this.    Plan:  Recovery and modality treatment for comfort for the next few days.  We will reevaluate Monday morning.  She has an appointment in the [facility] with Dr. Freddy Eagle (Dr. Caba is out of town next week) at 10:00 AM.  CC understands she is to report any increase/changes in symptoms over the weekend. She has a tens/heat unit to help manage pain.      CJ Arriaga, ATC

## 2021-06-28 ENCOUNTER — OFFICE VISIT (OUTPATIENT)
Dept: FAMILY MEDICINE | Facility: CLINIC | Age: 21
End: 2021-06-28
Payer: COMMERCIAL

## 2021-06-28 VITALS
DIASTOLIC BLOOD PRESSURE: 67 MMHG | HEIGHT: 69 IN | WEIGHT: 140.8 LBS | HEART RATE: 75 BPM | SYSTOLIC BLOOD PRESSURE: 113 MMHG | BODY MASS INDEX: 20.86 KG/M2

## 2021-06-28 DIAGNOSIS — M53.3 SACROILIAC JOINT PAIN: Primary | ICD-10-CM

## 2021-06-28 ASSESSMENT — MIFFLIN-ST. JEOR: SCORE: 1468.04

## 2021-06-28 NOTE — LETTER
6/28/2021      RE: Smitha Seaman  [address] [facility] [address]       [facility]            SUBJECTIVE:     Smitha Seaman is a 21 year old female  presenting to clinic today with concerns for LBP. Hx of SI joint pain in the past. Tye SANTIAGO, has treated her in conjunction with team physician Dr. Caba, who prescribed her ibuprofen and flexeril for her pain. She has also started rehabilitation (recovery and mobility). Was in [facility] with [employer] for th past few weeks training 6 days per week.     In 2/2020, modalities that were discussed were chiropractic care with massage, weight lifting limits, and cupping seemed to be the most helpful. She was offered gabapentin to help with the pain but did not take any at that time.     CC, today is improved. She has odell taking the ibuprofen and flexeril with improvement. Still mild pain on the right side. No radiaiton into legs. No bowel/bladder sitrubance. No pain awakening her from sleep. Season does not start again until August so she has time to rehab and rest.  No pain at night. No stiffness in the morning. No injury leading to this pain but she does admit a rapid ramp up in activity with the [employer] training.     PMH, Medications and Allergies were reviewed and updated as needed.    ROS:  As noted above otherwise negative.    There is no problem list on file for this patient.      Current Outpatient Medications   Medication Sig Dispense Refill     ferrous sulfate (SLO-FE) 142 (45 Fe) MG CR tablet Take 1 tablet (142 mg) by mouth daily (Patient not taking: Reported on 2/28/2021) 93 tablet 1     FLUCELVAX QUADRIVALENT 0.5 ML ELAINE PHARMACY ADMINISTERED       hydrOXYzine (VISTARIL) 25 MG capsule Take 1-2 pills by mouth one hour prior to bedtime and PRN anxiety up to 3x/day total 45 capsule 0     levonorgestrel (MIRENA) 20 MCG/24HR IUD 1 each by Intrauterine route       sertraline (ZOLOFT) 100 MG tablet Take 1  "tablet (100 mg) by mouth daily 93 tablet 0     spironolactone (ALDACTONE) 50 MG tablet TAKE 2 TABLETS BY MOUTH IN THE MORNING AND 1 TABLET AT BEDTIME                OBJECTIVE:     Vitals:   Vitals:    06/28/21 1004   BP: 113/67   Pulse: 75   Weight: 63.9 kg (140 lb 12.8 oz)   Height: 1.753 m (5' 9\")     BMI: Body mass index is 20.79 kg/m .    Gen:  Well nourished and in no acute distress  HEENT: Extraocular movement intact.  Neck: supple  CV: RRR. No murmurs, rubs, or gallops  Resp: Lungs CTA. NO evidence of consolidation, wheezing, rales, or crackles.   Skin: No rash, erythema, ecchymosis or obvious abnormality  Psych: Euthymic   Neuro: Alert and oriented.    MSK: No asymmetry noted of the b/l LE. Normal gait. No ttp of the right or left hip flexors, aiis, asis, or lateral trochanter. Full active and passive rom of the b/l LE. Strength in b/l LE 5/5.   - SLR b/l  - Stork  - Slump  + ASIS compression on right  + ASIS distraction with pain on the right  + Sacral Compression     MR Pelvis 02/2020:  Impression:     Normal MRI of the pelvis without evidence of sacroiliitis.       MR Lumbar spine 02/2020:   Impression:  Normal lumbar spine MRI. No evidence for spondylosis.         ASSESSMENT & PLAN:      Smitha was seen today for back pain.    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 Her symptoms as well as exam are consistent with SI pathology, most likely musculotendinous strain. She is without red flag s/s and is improving with rest and oral medications. She has also started aly-cupping and her first visit with a chiropractor was today.     The plan at this time, as discussed with athlete and ATC, will be to continue her current recommendations for treatment. For her pain, she can continue ibuprofen as needed. I have recommended that she " decrease the flexeril usage at this time as she is sleeping well and progressing as expected. She can continue to see her chiropractor and self-cup for continual progression. She should continue to limit high-impact activities for the moment (running, jumping, deadlifts) as she is still recovering and has mild-mod pain in the right SI area. I expect this to continue to resolve over the next two weeks. Rehabilitation and training modalities will be set forth by ATC. If no improvement, or symptoms worsen, over the next two weeks, repeat imaging should ensue of the pelvis as her last imaging was in February of 2020.     Options for treatment and/or follow-up care were reviewed with the patient and , Maximiliano Arriaga. Patient was actively involved in the decision making process. Patient verbalized understanding and was in agreement with the plan.    A copy of this note has been forwarded to the Head Volleyball Team Physician, Dr. Janna Caba MD, CCD, CAQSM    Rachid Eagle, DO  Primary Care Sports Medicine Fellow        Rachid Eagle, DO

## 2021-06-28 NOTE — PROGRESS NOTES
[facility]            SUBJECTIVE:     Smitha Seaman is a 21 year old female  presenting to clinic today with concerns for LBP. Hx of SI joint pain in the past. PAMELA, Tye, has treated her in conjunction with team physician Dr. Caba, who prescribed her ibuprofen and flexeril for her pain. She has also started rehabilitation (recovery and mobility). Was in [facility] with [employer] for th past few weeks training 6 days per week.     In 2/2020, modalities that were discussed were chiropractic care with massage, weight lifting limits, and cupping seemed to be the most helpful. She was offered gabapentin to help with the pain but did not take any at that time.     CC, today is improved. She has odell taking the ibuprofen and flexeril with improvement. Still mild pain on the right side. No radiaiton into legs. No bowel/bladder sitrubance. No pain awakening her from sleep. Season does not start again until August so she has time to rehab and rest.  No pain at night. No stiffness in the morning. No injury leading to this pain but she does admit a rapid ramp up in activity with the [employer] training.     PMH, Medications and Allergies were reviewed and updated as needed.    ROS:  As noted above otherwise negative.    There is no problem list on file for this patient.      Current Outpatient Medications   Medication Sig Dispense Refill     ferrous sulfate (SLO-FE) 142 (45 Fe) MG CR tablet Take 1 tablet (142 mg) by mouth daily (Patient not taking: Reported on 2/28/2021) 93 tablet 1     FLUCELVAX QUADRIVALENT 0.5 ML [facility] ADMINISTERED       hydrOXYzine (VISTARIL) 25 MG capsule Take 1-2 pills by mouth one hour prior to bedtime and PRN anxiety up to 3x/day total 45 capsule 0     levonorgestrel (MIRENA) 20 MCG/24HR IUD 1 each by Intrauterine route       sertraline (ZOLOFT) 100 MG tablet Take 1 tablet (100 mg) by mouth daily 93 tablet 0     spironolactone (ALDACTONE) 50 MG  "tablet TAKE 2 TABLETS BY MOUTH IN THE MORNING AND 1 TABLET AT BEDTIME                OBJECTIVE:     Vitals:   Vitals:    06/28/21 1004   BP: 113/67   Pulse: 75   Weight: 63.9 kg (140 lb 12.8 oz)   Height: 1.753 m (5' 9\")     BMI: Body mass index is 20.79 kg/m .    Gen:  Well nourished and in no acute distress  HEENT: Extraocular movement intact.  Neck: supple  CV: RRR. No murmurs, rubs, or gallops  Resp: Lungs CTA. NO evidence of consolidation, wheezing, rales, or crackles.   Skin: No rash, erythema, ecchymosis or obvious abnormality  Psych: Euthymic   Neuro: Alert and oriented.    MSK: No asymmetry noted of the b/l LE. Normal gait. No ttp of the right or left hip flexors, aiis, asis, or lateral trochanter. Full active and passive rom of the b/l LE. Strength in b/l LE 5/5.   - SLR b/l  - Stork  - Slump  + ASIS compression on right  + ASIS distraction with pain on the right  + Sacral Compression     MR Pelvis 02/2020:  Impression:     Normal MRI of the pelvis without evidence of sacroiliitis.       MR Lumbar spine 02/2020:   Impression:  Normal lumbar spine MRI. No evidence for spondylosis.         ASSESSMENT & PLAN:      Smitha was seen today for back pain.    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 Her symptoms as well as exam are consistent with SI pathology, most likely musculotendinous strain. She is without red flag s/s and is improving with rest and oral medications. She has also started aly-cupping and her first visit with a chiropractor was today.     The plan at this time, as discussed with athlete and ATC, will be to continue her current recommendations for treatment. For her pain, she can continue ibuprofen as needed. I have recommended that she decrease the flexeril usage at this time as she is sleeping well and progressing " as expected. She can continue to see her chiropractor and self-cup for continual progression. She should continue to limit high-impact activities for the moment (running, jumping, deadlifts) as she is still recovering and has mild-mod pain in the right SI area. I expect this to continue to resolve over the next two weeks. Rehabilitation and training modalities will be set forth by ATC. If no improvement, or symptoms worsen, over the next two weeks, repeat imaging should ensue of the pelvis as her last imaging was in February of 2020.     Options for treatment and/or follow-up care were reviewed with the patient and , Maximiliano Arriaga. Patient was actively involved in the decision making process. Patient verbalized understanding and was in agreement with the plan.    A copy of this note has been forwarded to the Head Volleyball Team Physician, Dr. Janna Caba MD, CCD, CAQSM    Rachid Eagle, DO  Primary Care Sports Medicine Fellow

## 2021-07-02 DIAGNOSIS — M54.50 CHRONIC LOW BACK PAIN WITHOUT SCIATICA, UNSPECIFIED BACK PAIN LATERALITY: Primary | ICD-10-CM

## 2021-07-02 DIAGNOSIS — G89.29 CHRONIC LOW BACK PAIN WITHOUT SCIATICA, UNSPECIFIED BACK PAIN LATERALITY: Primary | ICD-10-CM

## 2021-07-06 ENCOUNTER — ANCILLARY PROCEDURE (OUTPATIENT)
Dept: GENERAL RADIOLOGY | Facility: CLINIC | Age: 21
End: 2021-07-06
Attending: FAMILY MEDICINE
Payer: COMMERCIAL

## 2021-07-06 ENCOUNTER — ANCILLARY PROCEDURE (OUTPATIENT)
Dept: MRI IMAGING | Facility: CLINIC | Age: 21
End: 2021-07-06
Attending: FAMILY MEDICINE
Payer: COMMERCIAL

## 2021-07-06 DIAGNOSIS — M54.50 CHRONIC LOW BACK PAIN WITHOUT SCIATICA, UNSPECIFIED BACK PAIN LATERALITY: ICD-10-CM

## 2021-07-06 DIAGNOSIS — G89.29 CHRONIC LOW BACK PAIN WITHOUT SCIATICA, UNSPECIFIED BACK PAIN LATERALITY: ICD-10-CM

## 2021-07-06 PROCEDURE — 72100 X-RAY EXAM L-S SPINE 2/3 VWS: CPT | Performed by: RADIOLOGY

## 2021-07-06 PROCEDURE — 72148 MRI LUMBAR SPINE W/O DYE: CPT | Mod: GC | Performed by: RADIOLOGY

## 2021-07-07 ENCOUNTER — ANCILLARY PROCEDURE (OUTPATIENT)
Dept: MRI IMAGING | Facility: CLINIC | Age: 21
End: 2021-07-07
Attending: FAMILY MEDICINE
Payer: COMMERCIAL

## 2021-07-07 DIAGNOSIS — M54.50 CHRONIC BILATERAL LOW BACK PAIN WITHOUT SCIATICA: Primary | ICD-10-CM

## 2021-07-07 DIAGNOSIS — M54.50 CHRONIC BILATERAL LOW BACK PAIN WITHOUT SCIATICA: ICD-10-CM

## 2021-07-07 DIAGNOSIS — G89.29 CHRONIC BILATERAL LOW BACK PAIN WITHOUT SCIATICA: ICD-10-CM

## 2021-07-07 DIAGNOSIS — G89.29 CHRONIC BILATERAL LOW BACK PAIN WITHOUT SCIATICA: Primary | ICD-10-CM

## 2021-07-07 PROCEDURE — 72195 MRI PELVIS W/O DYE: CPT | Performed by: RADIOLOGY

## 2021-07-12 ENCOUNTER — OFFICE VISIT (OUTPATIENT)
Dept: FAMILY MEDICINE | Facility: CLINIC | Age: 21
End: 2021-07-12
Payer: COMMERCIAL

## 2021-07-12 DIAGNOSIS — M79.18 MYOFASCIAL PAIN SYNDROME OF LUMBAR SPINE: ICD-10-CM

## 2021-07-12 DIAGNOSIS — M53.3 SI (SACROILIAC) JOINT DYSFUNCTION: Primary | ICD-10-CM

## 2021-07-12 NOTE — LETTER
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  She had an unremarkable lumbar MRI ordered, except for unchanged mild degenerative changes noted at L3-4, by Dr. Freddy Eagle recently.  She was training with the [employer] and the training was intense and this is when her symptoms flared up.  She hasn't had a lot of SI joint pain previously but rather chronic lumbar myofascial pain with other normal lumbar MRIs in the past. 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  Oral medications helping some but not great.  She has avoided further training now that she is back at  but that isn't helping enough. She denies any other joint pain or concerning symptoms for a rheumatologic condition. She feels frustrated that she is struggling to improve. No radiation of pain.  No n/t.      Current Outpatient Medications   Medication     ferrous sulfate (SLO-FE) 142 (45 Fe) MG CR tablet     FLUCELVAX QUADRIVALENT 0.5 ML [facility]     hydrOXYzine (VISTARIL) 25 MG capsule     levonorgestrel (MIRENA) 20 MCG/24HR IUD     sertraline (ZOLOFT) 100 MG tablet     spironolactone (ALDACTONE) 50 MG tablet     No current facility-administered medications for this visit.     O:  NAD  There were no vitals taken for this visit.    Lumbar: Limited flexion and extension due to pain over the R>L SI joints.   Nttp over the SP  Mild tightness over the lumbar paraspinals R>L and the R>L QL.  +ttp over the R>L SI joints  +sacral thrust R, +posterior thigh trust R, + compression test R, +KELLY R, neg distraction R and L  SI joint special tests are all negative on the LEFT        Narrative & Impression   Exam: MRI of the sacroiliac joints dated 7/7/2021.     COMPARISON: 2/13/2020.     CLINICAL HISTORY: Back pain, evaluate for  sacroiliitis.     TECHNIQUE: Multiplanar, multisequence MR imaging of the sacroiliac  joints was obtained using standard sequences in 2 orthogonal planes  without the use of intravenous or intra-articular gadolinium contrast.     FINDINGS:     No bone marrow edema at the sacroiliac joints. No erosive changes. No  joint effusion at the sacroiliac joints.     No marrow signal abnormalities to suggest fracture or marrow  infiltration.     The muscle bulk is intact without significant atrophy or soft tissue  edema.                                                                      IMPRESSION: No MR findings to suggest sacroiliitis. No abnormal bone  marrow edema, erosive changes, or fluid at the sacroiliac joints.     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  Updated lumbar xrays and lumbar MRI were unchanged and pelvis MRI is normal without evidence of bone stress injury or findings of sacrolilitis.  She is not responding well to rest, rehab and oral medications.     P:  We reviewed the MRI findings. We discuss next steps including SI joint injection with CSI.  We could also try other oral medications to see if she gets a better response.  She would like to proceed with a RIGHT SI joint CSI injection.  We discussed the pros and cons of that procedure.  Recheck about two weeks following the injection.  Continue to limit VB training until she is hopefully improving from the SI joint injection.      Maximiliano Acuna ATC for  Volleyball was present for the entire appointment.       Janna Caba MD, CAQ, FACSM, CCD  [facility]  Sports Medicine and Bone Health  Team Physician;  Athletics          Janna Caba MD

## 2021-07-13 NOTE — PROGRESS NOTES
S:  20 yo   presents for f/u of pelvis MRI due to B (R>L) SI joint pain in addition to her chronic lumbar pain.  She had an unremarkable lumbar MRI ordered, except for unchanged mild degenerative changes noted at L3-4, by Dr. Freddy Eagle recently.  She was training with the [employer] and the training was intense and this is when her symptoms flared up.  She hasn't had a lot of SI joint pain previously but rather chronic lumbar myofascial pain with other normal lumbar MRIs in the past. 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  Oral medications helping some but not great.  She has avoided further training now that she is back at  but that isn't helping enough. She denies any other joint pain or concerning symptoms for a rheumatologic condition. She feels frustrated that she is struggling to improve. No radiation of pain.  No n/t.      Current Outpatient Medications   Medication     ferrous sulfate (SLO-FE) 142 (45 Fe) MG CR tablet     FLUCELVAX QUADRIVALENT 0.5 ML ELAINE     hydrOXYzine (VISTARIL) 25 MG capsule     levonorgestrel (MIRENA) 20 MCG/24HR IUD     sertraline (ZOLOFT) 100 MG tablet     spironolactone (ALDACTONE) 50 MG tablet     No current facility-administered medications for this visit.     O:  NAD  There were no vitals taken for this visit.    Lumbar: Limited flexion and extension due to pain over the R>L SI joints.   Nttp over the SP  Mild tightness over the lumbar paraspinals R>L and the R>L QL.  +ttp over the R>L SI joints  +sacral thrust R, +posterior thigh trust R, + compression test R, +KELLY R, neg distraction R and L  SI joint special tests are all negative on the LEFT        Narrative & Impression   Exam: MRI of the sacroiliac joints dated 7/7/2021.     COMPARISON: 2/13/2020.     CLINICAL HISTORY: Back pain, evaluate for sacroiliitis.     TECHNIQUE: Multiplanar, multisequence MR imaging of the  sacroiliac  joints was obtained using standard sequences in 2 orthogonal planes  without the use of intravenous or intra-articular gadolinium contrast.     FINDINGS:     No bone marrow edema at the sacroiliac joints. No erosive changes. No  joint effusion at the sacroiliac joints.     No marrow signal abnormalities to suggest fracture or marrow  infiltration.     The muscle bulk is intact without significant atrophy or soft tissue  edema.                                                                      IMPRESSION: No MR findings to suggest sacroiliitis. No abnormal bone  marrow edema, erosive changes, or fluid at the sacroiliac joints.     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  Updated lumbar xrays and lumbar MRI were unchanged and pelvis MRI is normal without evidence of bone stress injury or findings of sacrolilitis.  She is not responding well to rest, rehab and oral medications.     P:  We reviewed the MRI findings. We discuss next steps including SI joint injection with CSI.  We could also try other oral medications to see if she gets a better response.  She would like to proceed with a RIGHT SI joint CSI injection.  We discussed the pros and cons of that procedure.  Recheck about two weeks following the injection.  Continue to limit VB training until she is hopefully improving from the SI joint injection.      Maximiliano Acuna, PAMELA for  Volleyball was present for the entire appointment.       Janna Caba MD, CAQ, FACSM, CCD  [facility]  Sports Medicine and Bone Health  Team Physician;  Athletics

## 2021-08-02 ENCOUNTER — APPOINTMENT (OUTPATIENT)
Dept: FAMILY MEDICINE | Facility: CLINIC | Age: 21
End: 2021-08-02
Payer: COMMERCIAL

## 2021-08-10 ENCOUNTER — OFFICE VISIT (OUTPATIENT)
Dept: FAMILY MEDICINE | Facility: CLINIC | Age: 21
End: 2021-08-10
Payer: COMMERCIAL

## 2021-08-10 DIAGNOSIS — M53.3 SI (SACROILIAC) JOINT DYSFUNCTION: Primary | ICD-10-CM

## 2021-08-10 NOTE — LETTER
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 She did the RIGHT one first and has gotten 90%+ relief.  The LEFT SI joint wasn't improving and if fact worsened with increased activity.   The LEFT one was done last week and is feeling about 90% better has well. She is happy with the results as the preseason started on Aug 9.  Doesn't need any oral medications for pain at this time.     Current Outpatient Medications   Medication     ferrous sulfate (SLO-FE) 142 (45 Fe) MG CR tablet     FLUCELVAX QUADRIVALENT 0.5 ML [facility]     hydrOXYzine (VISTARIL) 25 MG capsule     levonorgestrel (MIRENA) 20 MCG/24HR IUD     sertraline (ZOLOFT) 100 MG tablet     spironolactone (ALDACTONE) 50 MG tablet     No current facility-administered medications for this visit.     O:  NAD  No vital signs today    Lumbar;  FROM painfree and nttp  SI joints:  Nttp B      A:  B SI joint dysfunction with an excellent response to CSIs    P:  Train as tolerated and modify as needed as we have done in the past for her LBP.   Consider the timing with her ATC if we would need to repeat these near the post season.     Maximiliano Acuna, ATC for  Volleyball was present for the entire appointment.     Janna Caba MD, CAQ, FACSM, CCD  [facility]  Sports Medicine and Bone Health  Team Physician;  Athletics         Janna Caba MD

## 2021-08-13 NOTE — PROGRESS NOTES
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 She did the RIGHT one first and has gotten 90%+ relief.  The LEFT SI joint wasn't improving and if fact worsened with increased activity.   The LEFT one was done last week and is feeling about 90% better has well. She is happy with the results as the preseason started on Aug 9.  Doesn't need any oral medications for pain at this time.     Current Outpatient Medications   Medication     ferrous sulfate (SLO-FE) 142 (45 Fe) MG CR tablet     FLUCELVAX QUADRIVALENT 0.5 ML E[facility]     hydrOXYzine (VISTARIL) 25 MG capsule     levonorgestrel (MIRENA) 20 MCG/24HR IUD     sertraline (ZOLOFT) 100 MG tablet     spironolactone (ALDACTONE) 50 MG tablet     No current facility-administered medications for this visit.     O:  NAD  No vital signs today    Lumbar;  FROM painfree and nttp  SI joints:  Nttp B      A:  B SI joint dysfunction with an excellent response to CSIs    P:  Train as tolerated and modify as needed as we have done in the past for her LBP.   Consider the timing with her ATC if we would need to repeat these near the post season.     Maximiliano Acuna, ATC for  Volleyball was present for the entire appointment.     Janna Caba MD, CAQ, FACSM, CCD  [facility]  Sports Medicine and Bone Health  Team Physician;  Athletics

## 2021-09-18 ENCOUNTER — HEALTH MAINTENANCE LETTER (OUTPATIENT)
Age: 21
End: 2021-09-18

## 2021-11-08 DIAGNOSIS — F41.1 GAD (GENERALIZED ANXIETY DISORDER): ICD-10-CM

## 2021-11-08 RX ORDER — SERTRALINE HYDROCHLORIDE 100 MG/1
100 TABLET, FILM COATED ORAL DAILY
Qty: 93 TABLET | Refills: 0 | Status: SHIPPED | OUTPATIENT
Start: 2021-11-08 | End: 2022-12-13

## 2021-11-29 ENCOUNTER — OFFICE VISIT (OUTPATIENT)
Dept: FAMILY MEDICINE | Facility: CLINIC | Age: 21
End: 2021-11-29
Payer: COMMERCIAL

## 2021-11-29 VITALS
SYSTOLIC BLOOD PRESSURE: 121 MMHG | DIASTOLIC BLOOD PRESSURE: 91 MMHG | HEIGHT: 69 IN | WEIGHT: 135.4 LBS | HEART RATE: 83 BPM | BODY MASS INDEX: 20.06 KG/M2

## 2021-11-29 DIAGNOSIS — G43.009 MIGRAINE WITHOUT AURA AND WITHOUT STATUS MIGRAINOSUS, NOT INTRACTABLE: Primary | ICD-10-CM

## 2021-11-29 ASSESSMENT — MIFFLIN-ST. JEOR: SCORE: 1443.55

## 2021-11-29 NOTE — PROGRESS NOTES
Attending Note:   I have  examined this patient and have reviewed the clinical presentation and progress note with the fellow. I agree with the treatment plan as outlined. The plan was formulated with the fellow on the day of the patient's visit.  Janna Caba MD, CAQ, CCD  [facility]  Sports Medicine and Bone Health

## 2021-11-29 NOTE — LETTER
11/29/2021      RE: Smitha Seaman  [address] [address]       Attending Note:   I have  examined this patient and have reviewed the clinical presentation and progress note with the fellow. I agree with the treatment plan as outlined. The plan was formulated with the fellow on the day of the patient's visit.  Janna Caba MD, CAQ, CCD  [facility]  Sports Medicine and Bone Health    [facility]            SUBJECTIVE:     Smitha Seaman is a 21 year old female  presenting to clinic today for right eye pain.    SA reports that in the 2nd set of their away game against [employer] 3 days ago she began to experience right eye pain and some difficulty focusing. She did not recall any direct contact with her head or face prior to this occurring. She was able to complete the game and the focus issues resolved but the discomfort persisted. The team then traveled to [place] where her eye pain continued and returned home to [place] on Sunday around 0200. Throughout the rest of the weekend her symptoms persisted and may have worsened. She took some tylenol which thinks helped and also had her SO massage her temples which helped. Today, she feels as though her right eye is more sensitive to light and she feels like she has a headache across the right side of her forehead. She has had some scant rhinorrhea and nasal congestion over the last two weeks but the mucous has been clear and she denies fever, chills, sore throat, eye redness, eye drainage, or productive cough. She does note some initial nausea with her symptoms that improved with hydration and did not lead to vomiting.     SA denies history of migraine    PMH, Medications and Allergies were reviewed and updated as needed.    ROS:  As noted above otherwise negative.    There is no problem list on file for this patient.      Current Outpatient Medications   Medication Sig Dispense  "Refill     ferrous sulfate (SLO-FE) 142 (45 Fe) MG CR tablet Take 1 tablet (142 mg) by mouth daily (Patient not taking: Reported on 2/28/2021) 93 tablet 1     FLUCELVAX QUADRIVALENT 0.5 ML [facility] ADMINISTERED       hydrOXYzine (VISTARIL) 25 MG capsule Take 1-2 pills by mouth one hour prior to bedtime and PRN anxiety up to 3x/day total 45 capsule 0     levonorgestrel (MIRENA) 20 MCG/24HR IUD 1 each by Intrauterine route       sertraline (ZOLOFT) 100 MG tablet Take 1 tablet (100 mg) by mouth daily 93 tablet 0     spironolactone (ALDACTONE) 50 MG tablet TAKE 2 TABLETS BY MOUTH IN THE MORNING AND 1 TABLET AT BEDTIME                OBJECTIVE:     Vitals:   Vitals:    11/29/21 0953   BP: (!) 121/91   Pulse: 83   Weight: 61.4 kg (135 lb 6.4 oz)   Height: 1.753 m (5' 9\")     BMI: Body mass index is 20 kg/m .    Physical Exam  Vitals reviewed.   Constitutional:       Appearance: Normal appearance.   HENT:      Head: Normocephalic.      Right Ear: Tympanic membrane, ear canal and external ear normal.      Left Ear: Tympanic membrane, ear canal and external ear normal.      Nose:      Right Sinus: Maxillary sinus tenderness present. No frontal sinus tenderness.      Mouth/Throat:      Mouth: Mucous membranes are moist.      Pharynx: Oropharynx is clear. No oropharyngeal exudate or posterior oropharyngeal erythema.   Eyes:      General: Lids are normal. Vision grossly intact.         Right eye: No discharge.      Extraocular Movements: Extraocular movements intact.      Conjunctiva/sclera: Conjunctivae normal.      Pupils: Pupils are equal, round, and reactive to light.   Neurological:      Mental Status: She is alert.                 ASSESSMENT & PLAN:      Smitha was seen today for eye problem.    Diagnoses and all orders for this visit:    Migraine without aura and without status migrainosus, not intractable: Differential diagnosis includes concussion, URI, sinusitis, conjunctivitis, or migraine. Given lack of " specific injury concussion seems unlikely. It is possible she also has a URI but doubt sinusitis given scant nasal discharge, lack of fever, and only minimal maxillary tenderness. Exam is not consistent with conjunctivitis. I favor migraine with possible triggers that include sleep deprivation, stress, and dehydration. The SA was advised to focus on sleep, avoid triggers, and utilize NSAIDs prn. Warning signs and symptoms for re-evaluation were discussed. SA to follow up prn if symptoms worsen or fail to improve.       Options for treatment and/or follow-up care were reviewed with the patient and , Sandi Brewer. Patient was actively involved in the decision making process. Patient verbalized understanding and was in agreement with the plan.    The patient was seen by and discussed with Dr.Suzanne CHARLES Caba MD, CAQ, CCD    Fady Zeng DO PGY-4  [facility] Sports Medicine Fellow 2021-22          Janna Caba MD

## 2021-11-30 ASSESSMENT — VISUAL ACUITY: OU: 1

## 2021-12-06 ENCOUNTER — VIRTUAL VISIT (OUTPATIENT)
Dept: FAMILY MEDICINE | Facility: CLINIC | Age: 21
End: 2021-12-06
Payer: COMMERCIAL

## 2021-12-06 DIAGNOSIS — U07.1 CLINICAL DIAGNOSIS OF COVID-19: Primary | ICD-10-CM

## 2021-12-06 NOTE — PROGRESS NOTES
CC is a 21 year old who is being evaluated via a billable video visit.      How would you like to obtain your AVS? MyChart  If the video visit is dropped, the invitation should be resent by: Text to cell phone: per ATC  Will anyone else be joining your video visit? No      Video Start Time: 1:18PM  Video-Visit Details    Type of service:  Video Visit    Video End Time:1:29PM    Originating Location (pt. 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     Tested positive on 12/2/21. She had a headache and eye pressure for several days prior to testing positive with symptoms starting on 11/26/21. She then developed rhinorrhea and lost her smell which is when she was tested.     Today is feeling better than any other day. Her headache is much better. Her eye pressure has resolved. Her sore throat has also resolved. She denies any upset stomach. She can't taste or smell, but her appetite is okay and she feels like she is eating normally. She denies any fevers, chills, shortness of breath or chest pain.     She previously had COVID but she doesn't recall having any symptoms. She has also been fully vaccinated.     PMH, Medications and Allergies were reviewed and updated as needed.    ROS:  As noted above in HPI otherwise negative.    There is no problem list on file for this patient.      Current Outpatient Medications   Medication Sig Dispense Refill     ferrous sulfate (SLO-FE) 142 (45 Fe) MG CR tablet Take 1 tablet (142 mg) by mouth daily (Patient not taking: Reported on 2/28/2021) 93 tablet 1     FLUCELVAX QUADRIVALENT 0.5 ML [facility] ADMINISTERED       hydrOXYzine (VISTARIL) 25 MG capsule Take  "1-2 pills by mouth one hour prior to bedtime and PRN anxiety up to 3x/day total 45 capsule 0     levonorgestrel (MIRENA) 20 MCG/24HR IUD 1 each by Intrauterine route       sertraline (ZOLOFT) 100 MG tablet Take 1 tablet (100 mg) by mouth daily 93 tablet 0     spironolactone (ALDACTONE) 50 MG tablet TAKE 2 TABLETS BY MOUTH IN THE MORNING AND 1 TABLET AT BEDTIME                OBJECTIVE:     LMP 11/27/2021   Estimated body mass index is 20 kg/m  as calculated from the following:    Height as of 11/29/21: 1.753 m (5' 9\").    Weight as of 11/29/21: 61.4 kg (135 lb 6.4 oz).     GENERAL: Healthy, alert and no distress  EYES: Eyes grossly normal to inspection.  No discharge or erythema, or obvious scleral/conjunctival abnormalities.  RESP: No coughing or respiratory distress  NEURO: Cranial nerves grossly intact.  Mentation and speech appropriate for age.  PSYCH: Mentation appears normal, affect normal/bright, judgement and insight intact, normal speech and appearance well-groomed.             ASSESSMENT & PLAN:      Smitha was seen today for covid 19 testing.    Diagnoses and all orders for this visit:    Clinical diagnosis of COVID-19: Symptoms started on 11/26/21 with headache and eye pressure and then further development of more classic symptoms on 12/2/21 with positive test on that date. CDC and MDH recommend using symptoms onset for start date of quarantine.   - EKG and troponin  - follow up appointment tomorrow after EKG and troponin and if normal can proceed to bike test    Return to clinic following above testing to review results. Return sooner if develops new or worsening symptoms.    Options for treatment and/or follow-up care were reviewed with the patient and , Maximiliano Acuna ATC. Patient was actively involved in the decision making process. Patient verbalized understanding and was in agreement with the plan.    The patient was seen by and discussed with Dr.Suzanne CHARLES Caba MD, CAQ, " CCD    Haylee Marc MD  Primary Care Sports Medicine Fellow      Haylee Marc MD

## 2021-12-07 ENCOUNTER — OFFICE VISIT (OUTPATIENT)
Dept: FAMILY MEDICINE | Facility: CLINIC | Age: 21
End: 2021-12-07
Payer: COMMERCIAL

## 2021-12-07 ENCOUNTER — LAB (OUTPATIENT)
Dept: LAB | Facility: CLINIC | Age: 21
End: 2021-12-07
Payer: COMMERCIAL

## 2021-12-07 VITALS
SYSTOLIC BLOOD PRESSURE: 124 MMHG | HEIGHT: 69 IN | WEIGHT: 136.4 LBS | HEART RATE: 68 BPM | DIASTOLIC BLOOD PRESSURE: 78 MMHG | BODY MASS INDEX: 20.2 KG/M2

## 2021-12-07 DIAGNOSIS — U07.1 CLINICAL DIAGNOSIS OF COVID-19: ICD-10-CM

## 2021-12-07 DIAGNOSIS — U07.1 CLINICAL DIAGNOSIS OF COVID-19: Primary | ICD-10-CM

## 2021-12-07 LAB — TROPONIN I SERPL HS-MCNC: 5 NG/L

## 2021-12-07 PROCEDURE — 84484 ASSAY OF TROPONIN QUANT: CPT | Performed by: PATHOLOGY

## 2021-12-07 PROCEDURE — 36415 COLL VENOUS BLD VENIPUNCTURE: CPT | Performed by: PATHOLOGY

## 2021-12-07 ASSESSMENT — MIFFLIN-ST. JEOR: SCORE: 1448.09

## 2021-12-07 NOTE — PROGRESS NOTES
Attending Note:   I have talked with this patient along with Dr. Marc via video visit and have reviewed the clinical presentation and watched the video examination with the fellow. I agree with the treatment plan as outlined. The plan was formulated with the fellow on the day of the patient's visit.   Janna Caba MD, CAQ, CCD  [facility]  Sports Medicine and Bone Health

## 2021-12-07 NOTE — LETTER
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 Tested positive on 12/2/21 with symptom onset on 11/26/21. During illness patient experienced headache, eye pressure, rhinorrhea, loss of taste and smell. She also had chills but normal temperature when taken and a minor. Denies having any symptoms of fever, night sweats, shortness of breath, wheezing, chest pain, cough.Their course is resolved. Since quarantine they have tried going for a walk without any symptoms.    She has been fully vaccinated for COVID with Moderna, with her second dose in May.     She did test positive for COVID in September of 2020.    PMH, Medications and Allergies were reviewed and updated as needed.    ROS:  As noted above in HPI otherwise negative.    There is no problem list on file for this patient.      Current Outpatient Medications   Medication Sig Dispense Refill     ferrous sulfate (SLO-FE) 142 (45 Fe) MG CR tablet Take 1 tablet (142 mg) by mouth daily (Patient not taking: Reported on 2/28/2021) 93 tablet 1     FLUCELVAX QUADRIVALENT 0.5 ML [facility] ADMINISTERED (Patient not taking: Reported on 12/7/2021)       hydrOXYzine (VISTARIL) 25 MG capsule Take 1-2 pills by mouth one hour prior to bedtime and PRN anxiety up to 3x/day total 45 capsule 0     levonorgestrel (MIRENA) 20 MCG/24HR IUD 1 each by Intrauterine route       sertraline (ZOLOFT) 100 MG tablet Take 1 tablet (100 mg) by mouth daily 93 tablet 0     spironolactone (ALDACTONE) 50 MG tablet TAKE 2 TABLETS BY MOUTH IN THE MORNING AND 1 TABLET AT BEDTIME (Patient not taking: Reported on 12/7/2021)                OBJECTIVE:     /78   Pulse 68   Ht 1.753 m (5' 9\")   Wt 61.9 kg (136 lb 6.4 oz)   " "LMP 11/27/2021   BMI 20.14 kg/m    Estimated body mass index is 20.14 kg/m  as calculated from the following:    Height as of this encounter: 1.753 m (5' 9\").    Weight as of this encounter: 61.9 kg (136 lb 6.4 oz).    GENERAL: Healthy, alert and no distress  EYES: Eyes grossly normal to inspection.  No discharge or erythema, or obvious scleral/conjunctival abnormalities.  Cardiac: Regular rate and rhythm. No murmer, rubs, or gallops.  RESP: No wheeze, rhonchi or rales. No cough, or visible cyanosis.  No visible retractions or increased work of breathing.    SKIN: Visible skin clear. No significant rash, abnormal pigmentation or lesions.  NEURO: Cranial nerves grossly intact.  Mentation and speech appropriate for age.  PSYCH: Mentation appears normal, affect normal/bright, judgement and insight intact, normal speech and appearance well-groomed.    Troponin I High Sensitivity <54 ng/L 5        EKG: Sinus rhythm with bradycardia of 56 bpm. No ST or T wave abnormalities.           ASSESSMENT & PLAN:      Smitha was seen today for suspected covid.    Diagnoses and all orders for this visit:    Clinical diagnosis of COVID-19: Symptoms started on 11/26/21 with headache and eye pressure and then further development of more classic symptoms on 12/2/21 with positive test on that date. CDC and MDH recommend using symptoms onset for start date of quarantine therefore quarantine ended 12/6/21.  - Done with Isolation   - Laboratory work negative as noted above including EKG, Troponin  - Will progress through return to play post COVID protocol with bike test   - discussed COVID booster as patient received 2nd dose in May, she is planning to wait until after the end of the season, cautioned she may want to schedule now for in a couple of weeks given decreased availabiltiy    Return to clinic following above testing to review results. Return sooner if develops new or worsening symptoms.    Options for treatment and/or follow-up " care were reviewed with the patient and , Maximiliano Acuna ATC. Patient was actively involved in the decision making process. Patient verbalized understanding and was in agreement with the plan.    The patient was discussed with Janna Caba MD.    Haylee Marc MD  Primary Care Sports Fellow      Attending Note:   I was present on the telephone with this patient, Dr. Marc and Maximiliano Acuna and have reviewed the clinical presentation and progress note with the fellow. I agree with the treatment plan as outlined. The plan was formulated with the fellow on the day of the patient's visit.   Janna Caba MD, CAQ, CCD  [facility]  Sports Medicine and Bone Health      Janna Caba MD

## 2021-12-07 NOTE — PROGRESS NOTES
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 Tested positive on 12/2/21 with symptom onset on 11/26/21. During illness patient experienced headache, eye pressure, rhinorrhea, loss of taste and smell. She also had chills but normal temperature when taken and a minor. Denies having any symptoms of fever, night sweats, shortness of breath, wheezing, chest pain, cough.Their course is resolved. Since quarantine they have tried going for a walk without any symptoms.    She has been fully vaccinated for COVID with Moderna, with her second dose in May.     She did test positive for COVID in September of 2020.    PMH, Medications and Allergies were reviewed and updated as needed.    ROS:  As noted above in HPI otherwise negative.    There is no problem list on file for this patient.      Current Outpatient Medications   Medication Sig Dispense Refill     ferrous sulfate (SLO-FE) 142 (45 Fe) MG CR tablet Take 1 tablet (142 mg) by mouth daily (Patient not taking: Reported on 2/28/2021) 93 tablet 1     FLUCELVAX QUADRIVALENT 0.5 ML [facility] ADMINISTERED (Patient not taking: Reported on 12/7/2021)       hydrOXYzine (VISTARIL) 25 MG capsule Take 1-2 pills by mouth one hour prior to bedtime and PRN anxiety up to 3x/day total 45 capsule 0     levonorgestrel (MIRENA) 20 MCG/24HR IUD 1 each by Intrauterine route       sertraline (ZOLOFT) 100 MG tablet Take 1 tablet (100 mg) by mouth daily 93 tablet 0     spironolactone (ALDACTONE) 50 MG tablet TAKE 2 TABLETS BY MOUTH IN THE MORNING AND 1 TABLET AT BEDTIME (Patient not taking: Reported on 12/7/2021)                OBJECTIVE:     /78   Pulse 68   Ht 1.753 m (5' 9\")   Wt 61.9 kg (136 lb 6.4 oz)   LMP 11/27/2021   BMI 20.14 kg/m    Estimated body mass index is 20.14 kg/m  as " "calculated from the following:    Height as of this encounter: 1.753 m (5' 9\").    Weight as of this encounter: 61.9 kg (136 lb 6.4 oz).    GENERAL: Healthy, alert and no distress  EYES: Eyes grossly normal to inspection.  No discharge or erythema, or obvious scleral/conjunctival abnormalities.  Cardiac: Regular rate and rhythm. No murmer, rubs, or gallops.  RESP: No wheeze, rhonchi or rales. No cough, or visible cyanosis.  No visible retractions or increased work of breathing.    SKIN: Visible skin clear. No significant rash, abnormal pigmentation or lesions.  NEURO: Cranial nerves grossly intact.  Mentation and speech appropriate for age.  PSYCH: Mentation appears normal, affect normal/bright, judgement and insight intact, normal speech and appearance well-groomed.    Troponin I High Sensitivity <54 ng/L 5        EKG: Sinus rhythm with bradycardia of 56 bpm. No ST or T wave abnormalities.           ASSESSMENT & PLAN:      Smitha was seen today for suspected covid.    Diagnoses and all orders for this visit:    Clinical diagnosis of COVID-19: Symptoms started on 11/26/21 with headache and eye pressure and then further development of more classic symptoms on 12/2/21 with positive test on that date. CDC and MDH recommend using symptoms onset for start date of quarantine therefore quarantine ended 12/6/21.  - Done with Isolation   - Laboratory work negative as noted above including EKG, Troponin  - Will progress through return to play post COVID protocol with bike test   - discussed COVID booster as patient received 2nd dose in May, she is planning to wait until after the end of the season, cautioned she may want to schedule now for in a couple of weeks given decreased availabiltiy    Return to clinic following above testing to review results. Return sooner if develops new or worsening symptoms.    Options for treatment and/or follow-up care were reviewed with the patient and , Maximiliano Acuna, " ATC. Patient was actively involved in the decision making process. Patient verbalized understanding and was in agreement with the plan.    The patient was discussed with Janna Caba MD.    Haylee Marc MD  Primary Care Sports Fellow

## 2021-12-07 NOTE — PROGRESS NOTES
Attending Note:   I was present on the telephone with this patient, Dr. Marc and Maximiliano Acuna and have reviewed the clinical presentation and progress note with the fellow. I agree with the treatment plan as outlined. The plan was formulated with the fellow on the day of the patient's visit.   Janna Caba MD, CAQ, CCD  [facility]  Sports Medicine and Bone Health

## 2022-01-31 DIAGNOSIS — F41.1 GAD (GENERALIZED ANXIETY DISORDER): ICD-10-CM

## 2022-02-03 NOTE — TELEPHONE ENCOUNTER
sertraline (ZOLOFT) 100 MG tablet      Last Written Prescription Date:  11-8-2021  Last Fill Quantity: 93,   # refills: 0  Last Office Visit : 12-7-2021  Future Office visit:  none    Routing refill request to provider for review/approval because:  Not on protocol.        Kathleen M Doege RN

## 2022-02-08 ENCOUNTER — OFFICE VISIT (OUTPATIENT)
Dept: FAMILY MEDICINE | Facility: CLINIC | Age: 22
End: 2022-02-08
Payer: COMMERCIAL

## 2022-02-08 VITALS
HEIGHT: 69 IN | BODY MASS INDEX: 19.99 KG/M2 | DIASTOLIC BLOOD PRESSURE: 68 MMHG | HEART RATE: 81 BPM | SYSTOLIC BLOOD PRESSURE: 117 MMHG | WEIGHT: 135 LBS

## 2022-02-08 DIAGNOSIS — F41.1 GAD (GENERALIZED ANXIETY DISORDER): Primary | ICD-10-CM

## 2022-02-08 DIAGNOSIS — R69 TAKING ONE MEDICATION FOR CHRONIC DISEASE: ICD-10-CM

## 2022-02-08 DIAGNOSIS — F43.10 PTSD (POST-TRAUMATIC STRESS DISORDER): ICD-10-CM

## 2022-02-08 RX ORDER — SERTRALINE HYDROCHLORIDE 100 MG/1
100 TABLET, FILM COATED ORAL DAILY
Qty: 93 TABLET | Refills: 1 | Status: SHIPPED | OUTPATIENT
Start: 2022-02-08 | End: 2022-08-11

## 2022-02-08 ASSESSMENT — ANXIETY QUESTIONNAIRES
6. BECOMING EASILY ANNOYED OR IRRITABLE: NOT AT ALL
IF YOU CHECKED OFF ANY PROBLEMS ON THIS QUESTIONNAIRE, HOW DIFFICULT HAVE THESE PROBLEMS MADE IT FOR YOU TO DO YOUR WORK, TAKE CARE OF THINGS AT HOME, OR GET ALONG WITH OTHER PEOPLE: NOT DIFFICULT AT ALL
2. NOT BEING ABLE TO STOP OR CONTROL WORRYING: NOT AT ALL
GAD7 TOTAL SCORE: 1
5. BEING SO RESTLESS THAT IT IS HARD TO SIT STILL: NOT AT ALL
1. FEELING NERVOUS, ANXIOUS, OR ON EDGE: NOT AT ALL
3. WORRYING TOO MUCH ABOUT DIFFERENT THINGS: NOT AT ALL
7. FEELING AFRAID AS IF SOMETHING AWFUL MIGHT HAPPEN: NOT AT ALL

## 2022-02-08 ASSESSMENT — PATIENT HEALTH QUESTIONNAIRE - PHQ9
SUM OF ALL RESPONSES TO PHQ QUESTIONS 1-9: 4
5. POOR APPETITE OR OVEREATING: SEVERAL DAYS

## 2022-02-08 ASSESSMENT — MIFFLIN-ST. JEOR: SCORE: 1441.74

## 2022-02-08 NOTE — LETTER
Date:February 14, 2022      Provider requested that no letter be sent. Do not send.       [facility]
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
## 2022-02-09 ASSESSMENT — ANXIETY QUESTIONNAIRES: GAD7 TOTAL SCORE: 1

## 2022-02-11 RX ORDER — SERTRALINE HYDROCHLORIDE 100 MG/1
100 TABLET, FILM COATED ORAL DAILY
Qty: 93 TABLET | OUTPATIENT
Start: 2022-02-11
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
## 2022-02-15 ENCOUNTER — ANCILLARY PROCEDURE (OUTPATIENT)
Dept: ULTRASOUND IMAGING | Facility: CLINIC | Age: 22
End: 2022-02-15
Attending: PHYSICIAN ASSISTANT
Payer: COMMERCIAL

## 2022-02-15 DIAGNOSIS — R10.31 RIGHT LOWER QUADRANT PAIN: ICD-10-CM

## 2022-02-15 PROCEDURE — 76830 TRANSVAGINAL US NON-OB: CPT | Mod: GC | Performed by: STUDENT IN AN ORGANIZED HEALTH CARE EDUCATION/TRAINING PROGRAM

## 2022-02-15 PROCEDURE — 76856 US EXAM PELVIC COMPLETE: CPT | Mod: GC | Performed by: STUDENT IN AN ORGANIZED HEALTH CARE EDUCATION/TRAINING PROGRAM

## 2022-02-22 ENCOUNTER — OFFICE VISIT (OUTPATIENT)
Dept: FAMILY MEDICINE | Facility: CLINIC | Age: 22
End: 2022-02-22
Payer: COMMERCIAL

## 2022-02-22 VITALS
DIASTOLIC BLOOD PRESSURE: 78 MMHG | HEIGHT: 69 IN | HEART RATE: 74 BPM | SYSTOLIC BLOOD PRESSURE: 123 MMHG | BODY MASS INDEX: 21.24 KG/M2 | WEIGHT: 143.4 LBS

## 2022-02-22 DIAGNOSIS — R93.89 ABNORMAL FINDING ON IMAGING: Primary | ICD-10-CM

## 2022-02-22 NOTE — LETTER
2/22/2022      RE: Smitha Seaman  [address] [facility] [address]       S: 23 yo female   presents for pelvic us follow-up ordered by an outside clinician due to RIGHT lower quadrant pain.    Pain started around her belly button with radiation to the RIGHT lower quadrant.  She had an evaluation for possible appy and that was negative.  A pelvic US was ordered to assess for an ovarian cyst.  She has never had that before. Due to the results of the pelvic US she was instructed to follow-up with her PCP or Team Physician to discuss the findings and possible PCOS.  Her pain symptoms have fully resolved.     EXAMINATION: US PELVIC TRANSABDOMINAL AND TRANSVAGINAL, 2/15/2022 5:41  PM      COMPARISON: None.     HISTORY: Possible ovarian cyst; Right lower quadrant pain     TECHNIQUE: The pelvis was scanned in standard fashion with  transabdominal and transvaginal transducer(s) using both grey scale  and limited color Doppler techniques.     FINDINGS:  The uterus measures 6.1 x 3.8 x 2.3 cm, and there is no evidence of a  focal fibroid.  The endometrium is within normal limits and measures 3  mm. IUD in appropriate position. There is no free fluid in the pelvis.     The right ovary measures 4.3 x 1.9 x 3.8 cm with volume of 16.2 ml and  the left ovary measures 4.2 x 3.8 x 2.2 cm with volume is 18.4 ml.  There is no adnexal mass. There is normal blood flow to the ovaries.     Mildly distended urinary bladder appears unremarkable.                                                                      IMPRESSION:   1. Numerous ovarian follicles with ovarian volume greater than 10 ml  bilaterally, which may be suggestive of polycystic ovarian syndrome in  the appropriate clinical setting.  2. Normal sonographic appearance of uterus with IUD in appropriate  position.     I have personally reviewed the examination and initial interpretation  and I agree with the findings.     KARIN DUVAL MD        "  CC denies:  -significant acne  -Menses started at 15 yo and she had episodes of continuous and prolonged bleeding.  She was started on OCPs by the end of 15 yo and later converted to an Mirena IUD.    -No problems with recurrent cyclical pelvic pain  -No family hx of PCOS or infertility  -Denies hirsutism  -No problems with her blood sugar      Current Outpatient Medications   Medication     ferrous sulfate (SLO-FE) 142 (45 Fe) MG CR tablet     FLUCELVAX QUADRIVALENT 0.5 ML ELAINE     hydrOXYzine (VISTARIL) 25 MG capsule     levonorgestrel (MIRENA) 20 MCG/24HR IUD     sertraline (ZOLOFT) 100 MG tablet     sertraline (ZOLOFT) 100 MG tablet     spironolactone (ALDACTONE) 50 MG tablet     No current facility-administered medications for this visit.       O:  NAD  /78   Pulse 74   Ht 1.753 m (5' 9\")   Wt 65 kg (143 lb 6.4 oz)   LMP 02/07/2022 (Exact Date)   BMI 21.18 kg/m      Abd;  Soft and nttp, no g/r        A:  Pelvic US with findings of numerous ovarian follicles with ovarian volume greater than 10 ml  Bilaterally (may be suggestive of PCOS)      P:  We discussed PCOS and the clinical criteria.   CAREY doesn't have any clinical manifestation of PCOS, although it is somewhat challenging to assess her menstrual status due due to starting OCPs within the year of her menarche which was delayed. She has continued to be on OCPs and then a Mirena IUD.  I have suggested that she monitor for pelvic pain and we could repeat an US along with labs if she continues to have symptoms.  I doubt that she has PSOC.  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   We also discussed that there are well establised medications for infertily should this be an issue in the future for her.      Fady Zeng, Sports Medicine Fellow was present for the entire appt and examined the patient.       Maximiliano Acuna, ATC for  [employer] was present for the " entire appointment.     Janna Caba MD, CAQ, FACSM, CCD  [facility]  Sports Medicine and Bone Health  Team Physician;  Athletics        Janna Caba MD

## 2022-02-22 NOTE — LETTER
Date:February 25, 2022      Patient was self referred, no letter generated. Do not send.        [facility] Health Information

## 2022-02-24 NOTE — PROGRESS NOTES
SUBJECTIVE: 19 year old female  c/o sore throat and fatigue  Worried that she has mono  Has no cough  Chills at nighttime     ROS: 10 point ROS neg other than the symptoms noted above in the HPI.  Afebrile, VSS    OBJECTIVE: The patient appears healthy, alert and no distress.   EARS: negative  NOSE/SINUS: Nares normal. Septum midline. Mucosa abnormal, red, swelling. No drainage or sinus tenderness.   THROAT:red, swollen tonsils, no signficiant exudate  NECK:Neck supple. Mild bilateral anterior cervical adenopathy. Thyroid symmetric, normal size,, Carotids without bruits.   CHEST: Clear to auscultation    ASSESSMENT: 20 yo female with pharyngitis, viral syndrome, concern for mono    PLAN:   Monospot ordered  Stay well hydrated  Tylenol and nsaids PRN  F/u in 1-2 weeks if not improved       ERIK FOX 31 f 2/21/2022 1990 DR FRANCINE FIORE     REVIEW OF SYMPTOMS      Able to give ROS  Yes     RELIABLE +/-   CONSTITUTIONAL Weakness Yes  Chills No   ENDOCRINE  No heat or cold intolerance    ALLERGY No hives  Sore throat No stridor  RESP Coughing blood no  Shortness of breath YES   NEURO No Headache  Confusion Pain neck No   CARDIAC No Chest pain No Palpitations   GI  Pain abdomen NO   Vomiting NO     PHYSICAL EXAM    HEENT Unremarkable  atraumatic   RESP Fair air entry EXP prolonged    Harsh breath sound Resp distres mild   CARDIAC S1 S2 No S3     NO JVD    ABDOMEN SOFT BS PRESENT NOT DISTENDED No hepatosplenomegaly PEDAL EDEMA present No calf tenderness  NO rash     DOA/CC/PROBLEMS poa .  2/21/2022 31 f  signed out ama from [facility] 2 d ago   cc pain abdomen vomiting   Fibrotic changes seen on ctap in rml and pulm consulted 2/21/2022       PMH-PSH .  pmh Colitis   pmh Diabetes mellitus type 1   pmh Gastroparesis due to DM   pmh History of cholecystectomy.  pmh Ectopic pregnancy 2009 and 2013, s/p removal of right and left fallopian tubes    PROBLEMS .  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                       COVID  STATUS.     2/21/2022 (-)      ICU STAY. none  GOC.  2/21/2022 full code     BEST PRACTICE ISSUES.                                                  HEAD OF BED ELEVATION. Yes  DVT PROPHYLAXIS.      JULES PROPHYLAXIS.   2/21/2022 famotidin 20                                                                                      DIET.   2/21/2022 cons carb      ASSESSMENT/RECOMMENDATIONS.    HEMODYNAMICS.   Monitor bp Target MAP 65 (+)    RESP.   Monitor po Target po 90-95%    OXYGEN REQUIREMENTS.  2/21/2022 ra 97%    INFECTION  Dental infection poa   Corona HKU infection 2/23 2/21/2022 T 101   w 2/21-2/23/2022 w 9.9 - 9   2/23 [facility] Coronavirus HKU (not covid)  2/21/2022 Infection barillas started  2/21/2022 empiric rocephin started by Dr Fiore   2/23/2022 started on unasyn Dr Z     FIBROTIC CHANGES IN LUNGS ON 2/21/2022 CTAP   Hx Pt gives ho having had BOOP () on bx done several years ago but did noit recall rx with steroids   ECHO 2/24/2022 ef 65% no vege   ctap ic 2/21/2022 (-)   lower chest showed stable fibrocalcific changes rml  ct ch 2/23/2022 extensive upper lobe scarring with linear densities and multiple foci of calcification   nsc from 10/2021   Pt advised to see me in office   Will need barillas for upper lobe scarring including TB in this diabetic pt      TIME SPENT   Over 25 minutes aggregate care time spent on encounter; activities included   direct patient care, counseling and/or coordinating care reviewing notes, lab data/ imaging , discussion with multidisciplinary team/ patient  /family and explaining in detail risks, benefits, alternatives  of the recommendations     ERIK FOX 31 f 2/21/2022 1990 DR FRANCINE FIORE

## 2022-02-25 NOTE — PROGRESS NOTES
S: 21 yo female   presents for pelvic us follow-up ordered by an outside clinician due to RIGHT lower quadrant pain.    Pain started around her belly button with radiation to the RIGHT lower quadrant.  She had an evaluation for possible appy and that was negative.  A pelvic US was ordered to assess for an ovarian cyst.  She has never had that before. Due to the results of the pelvic US she was instructed to follow-up with her PCP or Team Physician to discuss the findings and possible PCOS.  Her pain symptoms have fully resolved.     EXAMINATION: US PELVIC TRANSABDOMINAL AND TRANSVAGINAL, 2/15/2022 5:41  PM      COMPARISON: None.     HISTORY: Possible ovarian cyst; Right lower quadrant pain     TECHNIQUE: The pelvis was scanned in standard fashion with  transabdominal and transvaginal transducer(s) using both grey scale  and limited color Doppler techniques.     FINDINGS:  The uterus measures 6.1 x 3.8 x 2.3 cm, and there is no evidence of a  focal fibroid.  The endometrium is within normal limits and measures 3  mm. IUD in appropriate position. There is no free fluid in the pelvis.     The right ovary measures 4.3 x 1.9 x 3.8 cm with volume of 16.2 ml and  the left ovary measures 4.2 x 3.8 x 2.2 cm with volume is 18.4 ml.  There is no adnexal mass. There is normal blood flow to the ovaries.     Mildly distended urinary bladder appears unremarkable.                                                                      IMPRESSION:   1. Numerous ovarian follicles with ovarian volume greater than 10 ml  bilaterally, which may be suggestive of polycystic ovarian syndrome in  the appropriate clinical setting.  2. Normal sonographic appearance of uterus with IUD in appropriate  position.     I have personally reviewed the examination and initial interpretation  and I agree with the findings.     KARIN DUVAL MD         CC denies:  -significant acne  -Menses started at 15 yo and she had episodes of  "continuous and prolonged bleeding.  She was started on OCPs by the end of 15 yo and later converted to an Mirena IUD.    -No problems with recurrent cyclical pelvic pain  -No family hx of PCOS or infertility  -Denies hirsutism  -No problems with her blood sugar      Current Outpatient Medications   Medication     ferrous sulfate (SLO-FE) 142 (45 Fe) MG CR tablet     FLUCELVAX QUADRIVALENT 0.5 ML ELAINE     hydrOXYzine (VISTARIL) 25 MG capsule     levonorgestrel (MIRENA) 20 MCG/24HR IUD     sertraline (ZOLOFT) 100 MG tablet     sertraline (ZOLOFT) 100 MG tablet     spironolactone (ALDACTONE) 50 MG tablet     No current facility-administered medications for this visit.       O:  NAD  /78   Pulse 74   Ht 1.753 m (5' 9\")   Wt 65 kg (143 lb 6.4 oz)   LMP 02/07/2022 (Exact Date)   BMI 21.18 kg/m      Abd;  Soft and nttp, no g/r        A:  Pelvic US with findings of numerous ovarian follicles with ovarian volume greater than 10 ml  Bilaterally (may be suggestive of PCOS)      P:  We discussed PCOS and the clinical criteria.   CC doesn't have any clinical manifestation of PCOS, although it is somewhat challenging to assess her menstrual status due due to starting OCPs within the year of her menarche which was delayed. She has continued to be on OCPs and then a Mirena IUD.  I have suggested that she monitor for pelvic pain and we could repeat an US along with labs if she continues to have symptoms.  I doubt that she has PSOC.  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   We also discussed that there are well establised medications for infertily should this be an issue in the future for her.      Fady Zeng, Sports Medicine Fellow was present for the entire appt and examined the patient.       Maximiliano Acuna, PAMELA for  Volleyball was present for the entire appointment.     Janna Caba MD, CAQ, FACSM, CCD  [facility] [place]  Sports Medicine and Bone Health  Team Physician;  Athletics

## 2022-03-02 ENCOUNTER — LAB (OUTPATIENT)
Dept: LAB | Facility: CLINIC | Age: 22
End: 2022-03-02
Attending: FAMILY MEDICINE
Payer: COMMERCIAL

## 2022-03-02 DIAGNOSIS — R69 TAKING ONE MEDICATION FOR CHRONIC DISEASE: ICD-10-CM

## 2022-03-02 LAB
ALBUMIN SERPL-MCNC: 3.8 G/DL (ref 3.4–5)
ALP SERPL-CCNC: 68 U/L (ref 40–150)
ALT SERPL W P-5'-P-CCNC: 17 U/L (ref 0–50)
ANION GAP SERPL CALCULATED.3IONS-SCNC: 5 MMOL/L (ref 3–14)
AST SERPL W P-5'-P-CCNC: 14 U/L (ref 0–45)
BASOPHILS # BLD AUTO: 0.1 10E3/UL (ref 0–0.2)
BASOPHILS NFR BLD AUTO: 1 %
BILIRUB SERPL-MCNC: 0.2 MG/DL (ref 0.2–1.3)
BUN SERPL-MCNC: 14 MG/DL (ref 7–30)
CALCIUM SERPL-MCNC: 8.8 MG/DL (ref 8.5–10.1)
CHLORIDE BLD-SCNC: 108 MMOL/L (ref 94–109)
CO2 SERPL-SCNC: 30 MMOL/L (ref 20–32)
CREAT SERPL-MCNC: 0.78 MG/DL (ref 0.52–1.04)
EOSINOPHIL # BLD AUTO: 0.1 10E3/UL (ref 0–0.7)
EOSINOPHIL NFR BLD AUTO: 2 %
ERYTHROCYTE [DISTWIDTH] IN BLOOD BY AUTOMATED COUNT: 12.2 % (ref 10–15)
GFR SERPL CREATININE-BSD FRML MDRD: >90 ML/MIN/1.73M2
GLUCOSE BLD-MCNC: 90 MG/DL (ref 70–99)
HCT VFR BLD AUTO: 40 % (ref 35–47)
HGB BLD-MCNC: 13.1 G/DL (ref 11.7–15.7)
IMM GRANULOCYTES # BLD: 0 10E3/UL
IMM GRANULOCYTES NFR BLD: 1 %
LYMPHOCYTES # BLD AUTO: 2.1 10E3/UL (ref 0.8–5.3)
LYMPHOCYTES NFR BLD AUTO: 36 %
MCH RBC QN AUTO: 30.5 PG (ref 26.5–33)
MCHC RBC AUTO-ENTMCNC: 32.8 G/DL (ref 31.5–36.5)
MCV RBC AUTO: 93 FL (ref 78–100)
MONOCYTES # BLD AUTO: 0.6 10E3/UL (ref 0–1.3)
MONOCYTES NFR BLD AUTO: 10 %
NEUTROPHILS # BLD AUTO: 3 10E3/UL (ref 1.6–8.3)
NEUTROPHILS NFR BLD AUTO: 50 %
NRBC # BLD AUTO: 0 10E3/UL
NRBC BLD AUTO-RTO: 0 /100
PLATELET # BLD AUTO: 291 10E3/UL (ref 150–450)
POTASSIUM BLD-SCNC: 3.9 MMOL/L (ref 3.4–5.3)
PROT SERPL-MCNC: 6.9 G/DL (ref 6.8–8.8)
RBC # BLD AUTO: 4.3 10E6/UL (ref 3.8–5.2)
SODIUM SERPL-SCNC: 143 MMOL/L (ref 133–144)
WBC # BLD AUTO: 5.9 10E3/UL (ref 4–11)

## 2022-03-02 PROCEDURE — 36415 COLL VENOUS BLD VENIPUNCTURE: CPT | Performed by: PATHOLOGY

## 2022-03-02 PROCEDURE — 80053 COMPREHEN METABOLIC PANEL: CPT | Performed by: PATHOLOGY

## 2022-03-02 PROCEDURE — 85025 COMPLETE CBC W/AUTO DIFF WBC: CPT | Performed by: PATHOLOGY

## 2022-03-18 ENCOUNTER — OFFICE VISIT (OUTPATIENT)
Dept: ORTHOPEDICS | Facility: CLINIC | Age: 22
End: 2022-03-18
Payer: COMMERCIAL

## 2022-03-18 DIAGNOSIS — M76.51 PATELLAR TENDINITIS OF RIGHT KNEE: Primary | ICD-10-CM

## 2022-03-18 NOTE — LETTER
Date:March 21, 2022      Patient was self referred, no letter generated. Do not send.        [facility] Health Information

## 2022-03-19 DIAGNOSIS — M76.51 PATELLAR TENDINITIS OF RIGHT KNEE: Primary | ICD-10-CM

## 2022-03-19 PROCEDURE — 99214 OFFICE O/P EST MOD 30 MIN: CPT | Performed by: ORTHOPAEDIC SURGERY

## 2022-03-19 RX ORDER — DICLOFENAC SODIUM 75 MG/1
75 TABLET, DELAYED RELEASE ORAL 2 TIMES DAILY PRN
Qty: 60 TABLET | Refills: 0 | Status: SHIPPED | OUTPATIENT
Start: 2022-03-19 | End: 2022-12-13

## 2022-03-20 NOTE — PROGRESS NOTES
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  She plays [employer] and as such does not have a lot of jumping requirements but certainly does do a lot of of cytocide work and frequently is diving for balls.  She presents with a new onset of right proximal patella tendon pain that seemed to be flared up after she went to [place] for 1 week of playing with the national team.  She has not had a history of problems with this kind of pain throughout the season.  She reports that she did not do anything different than her usual routine but clearly played with more intensity and more reps during this 1 week time.    She is now back in our spring season.  She reports that her pain level is approximately 5.  It definitely quiets down when she has rest or relative rest such as a day off or over the weekend.  Stairs bother her more than walking on level ground.  She is never pain-free when she is actively using that knee.  It is right-sided only.    Physical exam of patient a female of fit body build.    Examination of patient's right knee reveals no swelling or prepatellar bursitis.  No ecchymosis or skin abrasions over her knees.  No knee swelling.  Full range of motion.    There is palpable crepitus at the proximal aspect of the tendon to direct touch.  This is not present on her opposite left knee.    Patellofemoral exam is stable.    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    Other ways in which we can help is to reduce wraps on anything that substantially aggravates the tendon in regards to practice.    She has had Voltaren in the past and this may also help quiet down her tendon  and I am will prescribe her 7 5 mg twice daily as needed pain    She may be helped with a type of Chopart strap.    At the end of the spring season I would advise she see Dr. Caba and consider PRP injections.  She should have ideally 2 to 4 weeks of no practice or play after the injections to maximize the effect of the PRP and healing of the tendon.    All questions were answered.    Room time 25 minutes consultation time 20    Poonam Hassan MD  Professor Orthopedic Surgery  [facility]

## 2022-04-29 ENCOUNTER — OFFICE VISIT (OUTPATIENT)
Dept: ORTHOPEDICS | Facility: CLINIC | Age: 22
End: 2022-04-29
Payer: COMMERCIAL

## 2022-04-29 VITALS — WEIGHT: 143 LBS | BODY MASS INDEX: 21.18 KG/M2 | HEIGHT: 69 IN

## 2022-04-29 DIAGNOSIS — M76.51 PATELLAR TENDINITIS OF RIGHT KNEE: Primary | ICD-10-CM

## 2022-04-29 PROCEDURE — 0232T NJX PLATELET PLASMA: CPT | Mod: RT | Performed by: FAMILY MEDICINE

## 2022-04-29 PROCEDURE — 99207 PR DROP WITH A PROCEDURE: CPT | Mod: GC | Performed by: FAMILY MEDICINE

## 2022-04-29 PROCEDURE — 20611 DRAIN/INJ JOINT/BURSA W/US: CPT | Mod: RT | Performed by: STUDENT IN AN ORGANIZED HEALTH CARE EDUCATION/TRAINING PROGRAM

## 2022-04-29 RX ORDER — LIDOCAINE HYDROCHLORIDE 10 MG/ML
4 INJECTION, SOLUTION EPIDURAL; INFILTRATION; INTRACAUDAL; PERINEURAL
Status: DISCONTINUED | OUTPATIENT
Start: 2022-04-29 | End: 2022-12-13

## 2022-04-29 RX ADMIN — LIDOCAINE HYDROCHLORIDE 4 ML: 10 INJECTION, SOLUTION EPIDURAL; INFILTRATION; INTRACAUDAL; PERINEURAL at 15:27

## 2022-04-29 NOTE — LETTER
Date:May 2, 2022      Patient was self referred, no letter generated. Do not send.        [facility] Health Information

## 2022-04-29 NOTE — NURSING NOTE
[facility] SPORTS MEDICINE  [address] [place] [id number]  Dept: [phone]  ______________________________________________________________________________    Patient: Smitha Seaman   : 2000   MRN: [id number]   2022    INVASIVE PROCEDURE SAFETY CHECKLIST    Date: 22   Procedure: Right patellar tendon PRP  Patient Name: Smitha Seaman  MRN: [id number]  YOB: 2000    Action: Complete sections as appropriate. Any discrepancy results in a HARD COPY until resolved.     PRE PROCEDURE:  Patient ID verified with 2 identifiers (name and  or MRN): Yes  Procedure and site verified with patient/designee (when able): Yes  Accurate consent documentation in medical record: Yes  H&P (or appropriate assessment) documented in medical record: Yes  H&P must be up to 20 days prior to procedure and updates within 24 hours of procedure as applicable: NA  Relevant diagnostic and radiology test results appropriately labeled and displayed as applicable: Yes  Procedure site(s) marked with provider initials: NA    TIMEOUT:  Time-Out performed immediately prior to starting procedure, including verbal and active participation of all team members addressing the following:Yes  * Correct patient identify  * Confirmed that the correct side and site are marked  * An accurate procedure consent form  * Agreement on the procedure to be done  * Correct patient position  * Relevant images and results are properly labeled and appropriately displayed  * The need to administer antibiotics or fluids for irrigation purposes during the procedure as applicable   * Safety precautions based on patient history or medication use    DURING PROCEDURE: Verification of correct person, site, and procedures any time the responsibility for care of the patient is transferred to another member of the care team.       Prior to injection, verified patient identity using patient's name and date of  birth.  Due to injection administration, patient instructed to remain in clinic for 15 minutes  afterwards, and to report any adverse reaction to me immediately.    Right patellar tendon PRP    Drug Amount Wasted:  Yes: 2 mg/ml   Vial/Syringe: Single dose vial  Expiration Date:  01/2025      Ivory Anand, PAMELA  April 29, 2022

## 2022-04-29 NOTE — LETTER
4/29/2022      RE: Smitha Seaman  [address] [facility] [address]     Dear Colleague,    Thank you for referring your patient, Smitha Seaman, to the [facility] SPORTS MEDICINE CLINIC [place]. Please see a copy of my visit note below.    Platelet Rich Plasma Injection -   The patient was informed of the risks and the benefits of the procedure and a written consent was signed.  The patient s right patellar tendon was prepped with chlorhexidine in sterile fashion.   60 cc of blood was drawn from the patient's antecubital fossa by a  into a dual lumen 60 cc syringe. Syringe was inserted into the [employer] centrifuge with appropriate counter balancing. Centrifuge was set at 1500 RPMs for 5 minutes. Syringe was extracted and the PRP was  from the red cells manually.  Injection was performed using sterile technique.  Under ultrasound guidance a 1.5-inch 22-gauge needle was used to fenestrate the right patellar tendon.  Multiple fenestrations, approximately 50, were made through the tendon at the bony attachment.  PRP was injected without difficulty.  Needle placement and injection were visualized and documented with ultrasound.  Ultrasound visualization was necessary to visualize fenestration and confirm injectate placement.  Images were permanently stored for the patient's record.  There were no complications. The patient tolerated the procedure well. There was negligible bleeding.   The patient was instructed to avoid NSAIDS for the next week and refrain from overuse over the next 3 days.   The patient was instructed to call or go to the emergency room with any unusual pain, swelling, redness, or if otherwise concerned.    Options for treatment and/or follow-up care were reviewed with the patient was actively involved in the decision making process. Patient verbalized understanding and was in agreement with the plan.    The patient was seen by and discussed with  Dr.Suzanne CHARLES Caba MD, CAQ, CCD    Fady Zeng DO PGY-4  [facility] Sports Medicine Fellow 2021-22      Large Joint Injection: Left patellar tendon PRP    Date/Time: 4/29/2022 3:27 PM  Performed by: Fady Zeng MD  Authorized by: Janna Caba MD     Indications:  Pain  Needle Size:  25 G  Guidance: ultrasound    Approach:  Anterior  Location:  Knee   Location comment:  Right knee patellar PRP      Medications:  4 mL lidocaine (PF) 1 %  Outcome:  Tolerated well, no immediate complications  Procedure discussed: discussed risks, benefits, and alternatives    Consent Given by:  Patient  Timeout: timeout called immediately prior to procedure    Prep: patient was prepped and draped in usual sterile fashion            Attending Note:   I have directly supervised the patellar tendon PRP procedure.     Janna Caba MD, CAQ, CCD  [facility]  Sports Medicine and Bone Health      Again, thank you for allowing me to participate in the care of your patient.      Sincerely,    Janna Caba MD

## 2022-04-29 NOTE — PROGRESS NOTES
Attending Note:   I have directly supervised the patellar tendon PRP procedure.     Janna Caba MD, CAQ, CCD  [facility]  Sports Medicine and Bone Health

## 2022-04-29 NOTE — PROGRESS NOTES
Platelet Rich Plasma Injection -   The patient was informed of the risks and the benefits of the procedure and a written consent was signed.  The patient s right patellar tendon was prepped with chlorhexidine in sterile fashion.   60 cc of blood was drawn from the patient's antecubital fossa by a  into a dual lumen 60 cc syringe. Syringe was inserted into the [employer] centrifuge with appropriate counter balancing. Centrifuge was set at 1500 RPMs for 5 minutes. Syringe was extracted and the PRP was  from the red cells manually.  Injection was performed using sterile technique.  Under ultrasound guidance a 1.5-inch 22-gauge needle was used to fenestrate the right patellar tendon.  Multiple fenestrations, approximately 50, were made through the tendon at the bony attachment.  PRP was injected without difficulty.  Needle placement and injection were visualized and documented with ultrasound.  Ultrasound visualization was necessary to visualize fenestration and confirm injectate placement.  Images were permanently stored for the patient's record.  There were no complications. The patient tolerated the procedure well. There was negligible bleeding.   The patient was instructed to avoid NSAIDS for the next week and refrain from overuse over the next 3 days.   The patient was instructed to call or go to the emergency room with any unusual pain, swelling, redness, or if otherwise concerned.    Options for treatment and/or follow-up care were reviewed with the patient was actively involved in the decision making process. Patient verbalized understanding and was in agreement with the plan.    The patient was seen by and discussed with Dr.Suzanne CHARLES Caba MD, CAQ, CCD    Fady Zeng DO PGY-4  [facility] Sports Medicine Fellow 2021-22      Large Joint Injection: Left patellar tendon PRP    Date/Time: 4/29/2022 3:27 PM  Performed by: Fady Zeng MD  Authorized by: Janna Caba  MD Ernst     Indications:  Pain  Needle Size:  25 G  Guidance: ultrasound    Approach:  Anterior  Location:  Knee   Location comment:  Right knee patellar PRP      Medications:  4 mL lidocaine (PF) 1 %  Outcome:  Tolerated well, no immediate complications  Procedure discussed: discussed risks, benefits, and alternatives    Consent Given by:  Patient  Timeout: timeout called immediately prior to procedure    Prep: patient was prepped and draped in usual sterile fashion

## 2022-06-25 ENCOUNTER — HEALTH MAINTENANCE LETTER (OUTPATIENT)
Age: 22
End: 2022-06-25

## 2022-08-11 DIAGNOSIS — F41.1 GAD (GENERALIZED ANXIETY DISORDER): ICD-10-CM

## 2022-08-11 DIAGNOSIS — F43.10 PTSD (POST-TRAUMATIC STRESS DISORDER): ICD-10-CM

## 2022-08-11 RX ORDER — SERTRALINE HYDROCHLORIDE 100 MG/1
100 TABLET, FILM COATED ORAL DAILY
Qty: 93 TABLET | Refills: 1 | Status: SHIPPED | OUTPATIENT
Start: 2022-08-11 | End: 2022-12-13 | Stop reason: ALTCHOICE

## 2022-08-23 ENCOUNTER — OFFICE VISIT (OUTPATIENT)
Dept: FAMILY MEDICINE | Facility: CLINIC | Age: 22
End: 2022-08-23
Payer: COMMERCIAL

## 2022-08-23 VITALS
DIASTOLIC BLOOD PRESSURE: 71 MMHG | HEIGHT: 69 IN | HEART RATE: 67 BPM | SYSTOLIC BLOOD PRESSURE: 120 MMHG | BODY MASS INDEX: 21.28 KG/M2 | WEIGHT: 143.7 LBS

## 2022-08-23 DIAGNOSIS — D23.9 DERMATOFIBROMA: Primary | ICD-10-CM

## 2022-08-23 NOTE — PROGRESS NOTES
"S: CC is a 21 yo female   who presents with a mass on the L lateral rib cage.   -2 months approx noticed lump over the L lateral rib cage distally  -Sore with rubbing from sports bra and [place] recently  -Might be increasing in size.  -No other masses like this  -Feels well otherwise      Current Outpatient Medications   Medication     sertraline (ZOLOFT) 100 MG tablet     diclofenac (VOLTAREN) 75 MG EC tablet     ferrous sulfate (SLO-FE) 142 (45 Fe) MG CR tablet     FLUCELVAX QUADRIVALENT 0.5 ML ELAINE     hydrOXYzine (VISTARIL) 25 MG capsule     levonorgestrel (MIRENA) 20 MCG/24HR IUD     sertraline (ZOLOFT) 100 MG tablet     spironolactone (ALDACTONE) 50 MG tablet     Current Facility-Administered Medications   Medication     lidocaine (PF) (XYLOCAINE) 1 % injection 4 mL     O:  NAD  /71   Pulse 67   Ht 1.753 m (5' 9\")   Wt 65.2 kg (143 lb 11.2 oz)   LMP 08/16/2022 (Exact Date)   BMI 21.22 kg/m      Chest wall:   L lateral ribs:  A small (approx 7 mm) round mass that is mobile and appears to be with the subcutaneous tissues and minimally ttp is noted. No other masses noted.     US assessment:  A solid mass is noted with the subcutaneous tissue      A:  L lateral chest probable dermatofibroma in a      P: Recommend observation with padding over the dermatofibroma ( ie Orthogel or lidocaine patch) to help protect it from friction from her sports bra/activity monitor.  If this is helping we could consider excision.  Monitor symptoms over the next couple of weeks and then we will decide if excision is indicated.         Maximiliano Acuna, PAMELA for  [employer] was present for the entire appointment.     Damien Ni DO, Sports Medicine Fellow      Janna Caba MD, CAQ, FACSM, CCD  [facility]  Sports Medicine and Bone Health  Team Physician;  Athletics    "

## 2022-08-23 NOTE — LETTER
"  8/23/2022    RE: Smitha Seaman  [address] [address]     Dear Colleague,    Thank you for referring your patient, Smitha Seaman, to the [facility]ENT [facility]IC [facility]INIC. Please see a copy of my visit note below.    S: CAREY is a 23 yo female   who presents with a mass on the L lateral rib cage.   -2 months approx noticed lump over the L lateral rib cage distally  -Sore with rubbing from sports bra and [place] recently  -Might be increasing in size.  -No other masses like this  -Feels well otherwise      Current Outpatient Medications   Medication     sertraline (ZOLOFT) 100 MG tablet     diclofenac (VOLTAREN) 75 MG EC tablet     ferrous sulfate (SLO-FE) 142 (45 Fe) MG CR tablet     FLUCELVAX QUADRIVALENT 0.5 ML ELAINE     hydrOXYzine (VISTARIL) 25 MG capsule     levonorgestrel (MIRENA) 20 MCG/24HR IUD     sertraline (ZOLOFT) 100 MG tablet     spironolactone (ALDACTONE) 50 MG tablet     Current Facility-Administered Medications   Medication     lidocaine (PF) (XYLOCAINE) 1 % injection 4 mL     O:  NAD  /71   Pulse 67   Ht 1.753 m (5' 9\")   Wt 65.2 kg (143 lb 11.2 oz)   LMP 08/16/2022 (Exact Date)   BMI 21.22 kg/m      Chest wall:   L lateral ribs:  A small (approx 7 mm) round mass that is mobile and appears to be with the subcutaneous tissues and minimally ttp is noted. No other masses noted.     US assessment:  A solid mass is noted with the subcutaneous tissue      A:  L lateral chest probable dermatofibroma in a      P: Recommend observation with padding over the dermatofibroma ( ie Orthogel or lidocaine patch) to help protect it from friction from her sports bra/activity monitor.  If this is helping we could consider excision.  Monitor symptoms over the next couple of weeks and then we will decide if excision is indicated.         Maximiliano Acuna, PAMELA for  [employer] was present for the entire appointment.     Damien Ni, , " Sports Medicine Fellow      Janna Caba MD, CAQ, FACSM, CCD  [facility]  Sports Medicine and Bone Health  Team Physician;  Athletics

## 2022-08-24 ENCOUNTER — ANCILLARY PROCEDURE (OUTPATIENT)
Dept: GENERAL RADIOLOGY | Facility: CLINIC | Age: 22
End: 2022-08-24
Attending: FAMILY MEDICINE
Payer: COMMERCIAL

## 2022-08-24 ENCOUNTER — OFFICE VISIT (OUTPATIENT)
Dept: ORTHOPEDICS | Facility: CLINIC | Age: 22
End: 2022-08-24
Payer: COMMERCIAL

## 2022-08-24 VITALS — HEIGHT: 69 IN | BODY MASS INDEX: 21.18 KG/M2 | WEIGHT: 143 LBS

## 2022-08-24 DIAGNOSIS — M25.532 LEFT WRIST PAIN: Primary | ICD-10-CM

## 2022-08-24 DIAGNOSIS — M25.532 LEFT WRIST PAIN: ICD-10-CM

## 2022-08-24 PROCEDURE — 73110 X-RAY EXAM OF WRIST: CPT | Mod: LT | Performed by: RADIOLOGY

## 2022-08-24 PROCEDURE — 99213 OFFICE O/P EST LOW 20 MIN: CPT | Mod: GC | Performed by: FAMILY MEDICINE

## 2022-08-24 PROCEDURE — 73100 X-RAY EXAM OF WRIST: CPT | Mod: LT | Performed by: RADIOLOGY

## 2022-08-24 RX ORDER — DICLOFENAC SODIUM 75 MG/1
75 TABLET, DELAYED RELEASE ORAL 2 TIMES DAILY PRN
Qty: 30 TABLET | Refills: 0 | Status: SHIPPED | OUTPATIENT
Start: 2022-08-24 | End: 2022-09-19

## 2022-08-24 NOTE — LETTER
"  8/24/2022      RE: Smitha Seaman  [address] [address]     Dear Colleague,    Thank you for referring your patient, Smitha Seaman, to the [facility] SPORTS MEDICINE CLINIC [place]. Please see a copy of my visit note below.    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:   Pain started yesterday during practice  Possible FOOSH injury during volleyball practice and then it was hurting after that - [employer] volleyball team member  Today, feeling sharp pain with flexion, extension, wrist rotation  No swelling or bruising that she's noticed  Tried to practice today though had pain, so left practice to come get an evaluation  Here w/ ATC Tye  Leaving for  Today for [place] for 2 away games for one week      Exam:  Ht 1.753 m (5' 9\")   Wt 64.9 kg (143 lb)   LMP 08/16/2022 (Exact Date)   BMI 21.12 kg/m    GEN: Appears stated age, NAD  Respiratory: Breathing comfortably on room air  Psych: Normal mood and  Affect    MSK:  Left hand/wrist -  No bruising or swelling  Moderately reduced wrist flexion and extension secondary to pain, full radial and ulnar deviation  Pain with radial > ulnar resisted motion  Strength 5/5 throughout   NTTP over the mid or distal radius  No TTP throughout ulna  Most significant point TTP over scapholunate space  Snuffbox tenderness, scaphoid tubercle tenderness and volar aspect but less than scapholunate space tenderness  Pain over scaphoid with axial load on thumb  Pain with closed fist over the scapholunate space.   strength is reduced and painful.     Nttp over the elbow with FROM and no swelling    -----    XRAY:  3 views left wrist radiographs 8/24/2022 11:28 AM     History: Left wrist pain     Comparison: None available.     Findings:     PA, oblique and lateral view(s) of the left wrist were obtained.      No acute osseous abnormality.  No erosion.     No substantial degenerative change.       Soft tissue is unremarkable.                  "                                                    Impression:  1. No acute osseous abnormality.  2. No substantial degenerative change.     WINIFRED RADFORD     ----    POCUS 08/24/22  Scapholunate ligament appears intact, though mild hypoechoic mid substance irregularity visualized with associated swelling.    -----------    A:  Significant TTP over scapholunate, with POCUS demonstrating likely partial mid substance mild tearing. Ligament appears intact and no scapholunate widening.  Xrays negative for scaphoid fracture, though mechanism and exam certainly concerning.    P:  -Okay to RTP as pain allows, w/ activity modifications (limit wrist extension) and taping - discussed with her and Tye SANTIAGO  -RTC p travel trip  for repeat exams, possible MRI depending on her exam and symptoms at that time      Riki Parker MD - PGY3  [facility]      Precepted with Dr. Janna Caba MD, CAQ, CCD          Attending Note:   I have personally examined this patient and have reviewed the clinical presentation and progress note with the resident.  I agree with the treatment plan as outlined. The plan was formulated with the resident on the day of the patient's visit. I have reviewed all imaging with the resident and agree with the findings in the documentation.     Janna Caba MD, CAQ, CCD  [facility]  Sports Medicine and Bone Health        Again, thank you for allowing me to participate in the care of your patient.      Sincerely,    Janna Caba MD

## 2022-08-24 NOTE — LETTER
Date:August 29, 2022      Patient was self referred, no letter generated. Do not send.        [facility] Health Information

## 2022-08-24 NOTE — PROGRESS NOTES
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  Today, feeling sharp pain with flexion, extension, wrist rotation  No swelling or bruising that she's noticed  Tried to practice today though had pain, so left practice to come get an evaluation  Here w/ ATC Tye  Leaving for  Today for [place] for 2 away games for one week      Exam:  Ht 1.753 m (5' 9\")   Wt 64.9 kg (143 lb)   LMP 08/16/2022 (Exact Date)   BMI 21.12 kg/m    GEN: Appears stated age, NAD  Respiratory: Breathing comfortably on room air  Psych: Normal mood and  Affect    MSK:  Left hand/wrist -  No bruising or swelling  Moderately reduced wrist flexion and extension secondary to pain, full radial and ulnar deviation  Pain with radial > ulnar resisted motion  Strength 5/5 throughout   NTTP over the mid or distal radius  No TTP throughout ulna  Most significant point TTP over scapholunate space  Snuffbox tenderness, scaphoid tubercle tenderness and volar aspect but less than scapholunate space tenderness  Pain over scaphoid with axial load on thumb  Pain with closed fist over the scapholunate space.   strength is reduced and painful.     Nttp over the elbow with FROM and no swelling    -----    XRAY:  3 views left wrist radiographs 8/24/2022 11:28 AM     History: Left wrist pain     Comparison: None available.     Findings:     PA, oblique and lateral view(s) of the left wrist were obtained.      No acute osseous abnormality.  No erosion.     No substantial degenerative change.       Soft tissue is unremarkable.                                                                     Impression:  1. No acute osseous abnormality.  2. No substantial degenerative change.     WINIFRED RADFORD     ----    POCUS 08/24/22  Scapholunate ligament appears intact, though mild hypoechoic mid substance " irregularity visualized with associated swelling.    -----------    A:  Significant TTP over scapholunate, with POCUS demonstrating likely partial mid substance mild tearing. Ligament appears intact and no scapholunate widening.  Xrays negative for scaphoid fracture, though mechanism and exam certainly concerning.    P:  -Okay to RTP as pain allows, w/ activity modifications (limit wrist extension) and taping - discussed with her and Tye SANTIAGO  -RTC p travel trip  for repeat exams, possible MRI depending on her exam and symptoms at that time      Riki Parker MD - PGY3  [facility]      Precepted with Dr. Janna Caba MD, CAQ, CCD

## 2022-08-28 NOTE — PROGRESS NOTES
Attending Note:   I have personally examined this patient and have reviewed the clinical presentation and progress note with the resident.  I agree with the treatment plan as outlined. The plan was formulated with the resident on the day of the patient's visit. I have reviewed all imaging with the resident and agree with the findings in the documentation.     Janna Caba MD, CAQ, CCD  [facility]  Sports Medicine and Bone Health

## 2022-09-19 ENCOUNTER — OFFICE VISIT (OUTPATIENT)
Dept: FAMILY MEDICINE | Facility: CLINIC | Age: 22
End: 2022-09-19
Payer: COMMERCIAL

## 2022-09-19 VITALS
SYSTOLIC BLOOD PRESSURE: 117 MMHG | DIASTOLIC BLOOD PRESSURE: 67 MMHG | BODY MASS INDEX: 20.5 KG/M2 | HEART RATE: 54 BPM | WEIGHT: 138.4 LBS | HEIGHT: 69 IN

## 2022-09-19 DIAGNOSIS — M54.6 BILATERAL THORACIC BACK PAIN, UNSPECIFIED CHRONICITY: Primary | ICD-10-CM

## 2022-09-19 RX ORDER — CYCLOBENZAPRINE HCL 10 MG
10 TABLET ORAL 3 TIMES DAILY PRN
Qty: 20 TABLET | Refills: 0 | Status: SHIPPED | OUTPATIENT
Start: 2022-09-19 | End: 2022-12-13

## 2022-09-19 RX ORDER — DICLOFENAC SODIUM 75 MG/1
75 TABLET, DELAYED RELEASE ORAL 2 TIMES DAILY PRN
Qty: 62 TABLET | Refills: 0 | Status: SHIPPED | OUTPATIENT
Start: 2022-09-19 | End: 2022-12-13

## 2022-09-19 NOTE — LETTER
"  9/19/2022      RE: Smitha Seaman  [address] [address]     Dear Colleague,    Thank you for referring your patient, Smitha Seaman, to the [facility] STUDENT ATHLETIC CLINIC. Please see a copy of my visit note below.    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   -Very tight.   -Some radiation   -No n/t  -Saw the Chiro thinking she had subluxed a rib.  Minimal benefit from this treatment.   -Rehab with ATC but not helping much.   -Ho h/o of thoracic back pain  -Hard to sleep      No change to PMH    Meds;  Zoloft  Spironolactone for acne  Vistaril PRN anxiety  Mirena IUD      O:  NAD  /67   Pulse 54   Ht 1.753 m (5' 9\")   Wt 62.8 kg (138 lb 6.4 oz)   LMP 08/16/2022 (Exact Date)   BMI 20.44 kg/m      Thoracic spine:   Limited extension and flexion due to pain.  Mild decreased rom of thoracic rotation and side bending with all motions increasing her pain.   +tightness along the L>>R thoracic paraspinals (T4-T10( with +ttp over the spinous processes    Lumbar;  Good rom, nttp, no tightness noted.     A:  Thoracic back pain in a      P:  Recommend restarting diclofenac 75 mg bid with meals which helped her lumbar back pain in the past.  Will add flexeril 10mg at bedtime to help with spasm and sleep.   Recommend thoracic MRI.   Modify activities per ATC.    Recheck p MRI.     Maximiliano Acuna, ATC for  [employer] was present for the entire appointment.        Daisha Sinclair DO, Sports Medicine Fellow saw and examined the patient as well.    Janna Caba MD, CAQ, FACSM, CCD  [facility]  Sports Medicine and Bone Health  Team Physician;  Athletics            Again, thank you for allowing me to participate in the care of your patient.      Sincerely,    Janna Caba MD    "

## 2022-09-19 NOTE — LETTER
Date:September 26, 2022      Patient was self referred, no letter generated. Do not send.        [facility] Health Information

## 2022-09-22 NOTE — PROGRESS NOTES
"S:  23 yo female [employer]  presents with thoracic back pain for approx a week or so.   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   -Very tight.   -Some radiation   -No n/t  -Saw the Chiro thinking she had subluxed a rib.  Minimal benefit from this treatment.   -Rehab with ATC but not helping much.   -Ho h/o of thoracic back pain  -Hard to sleep      No change to PMH    Meds;  Zoloft  Spironolactone for acne  Vistaril PRN anxiety  Mirena IUD      O:  NAD  /67   Pulse 54   Ht 1.753 m (5' 9\")   Wt 62.8 kg (138 lb 6.4 oz)   LMP 08/16/2022 (Exact Date)   BMI 20.44 kg/m      Thoracic spine:   Limited extension and flexion due to pain.  Mild decreased rom of thoracic rotation and side bending with all motions increasing her pain.   +tightness along the L>>R thoracic paraspinals (T4-T10( with +ttp over the spinous processes    Lumbar;  Good rom, nttp, no tightness noted.     A:  Thoracic back pain in a [employer]     P:  Recommend restarting diclofenac 75 mg bid with meals which helped her lumbar back pain in the past.  Will add flexeril 10mg at bedtime to help with spasm and sleep.   Recommend thoracic MRI.   Modify activities per ATC.    Recheck p MRI.     Maximiliano Acuna, ATC for  [employer] was present for the entire appointment.        Daisha Sinclair DO, Sports Medicine Fellow saw and examined the patient as well.    Janna Caba MD, CAQ, FACSM, CCD  [facility]  Sports Medicine and Bone Health  Team Physician;  Athletics        "

## 2022-09-26 ENCOUNTER — ANCILLARY PROCEDURE (OUTPATIENT)
Dept: MRI IMAGING | Facility: CLINIC | Age: 22
End: 2022-09-26
Attending: FAMILY MEDICINE
Payer: COMMERCIAL

## 2022-09-26 DIAGNOSIS — M54.6 BILATERAL THORACIC BACK PAIN, UNSPECIFIED CHRONICITY: ICD-10-CM

## 2022-09-26 PROCEDURE — 72146 MRI CHEST SPINE W/O DYE: CPT | Performed by: RADIOLOGY

## 2022-10-15 ENCOUNTER — DOCUMENTATION ONLY (OUTPATIENT)
Dept: FAMILY MEDICINE | Facility: CLINIC | Age: 22
End: 2022-10-15
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
## 2022-11-19 ENCOUNTER — HEALTH MAINTENANCE LETTER (OUTPATIENT)
Age: 22
End: 2022-11-19

## 2022-12-13 ENCOUNTER — OFFICE VISIT (OUTPATIENT)
Dept: FAMILY MEDICINE | Facility: CLINIC | Age: 22
End: 2022-12-13
Payer: COMMERCIAL

## 2022-12-13 VITALS
DIASTOLIC BLOOD PRESSURE: 80 MMHG | BODY MASS INDEX: 20.53 KG/M2 | SYSTOLIC BLOOD PRESSURE: 123 MMHG | HEIGHT: 69 IN | HEART RATE: 65 BPM | WEIGHT: 138.6 LBS

## 2022-12-13 DIAGNOSIS — F43.10 PTSD (POST-TRAUMATIC STRESS DISORDER): ICD-10-CM

## 2022-12-13 DIAGNOSIS — M76.52 PATELLAR TENDINITIS OF BOTH KNEES: Primary | ICD-10-CM

## 2022-12-13 DIAGNOSIS — F41.1 GAD (GENERALIZED ANXIETY DISORDER): ICD-10-CM

## 2022-12-13 DIAGNOSIS — M76.51 PATELLAR TENDINITIS OF BOTH KNEES: Primary | ICD-10-CM

## 2022-12-13 RX ORDER — SERTRALINE HYDROCHLORIDE 100 MG/1
100 TABLET, FILM COATED ORAL DAILY
Qty: 93 TABLET | Refills: 1 | Status: SHIPPED | OUTPATIENT
Start: 2023-01-01 | End: 2023-04-04
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
## 2022-12-13 NOTE — LETTER
Date:December 19, 2022      Patient was self referred, no letter generated. Do not send.        [facility] Health Information

## 2022-12-15 DIAGNOSIS — M25.561 CHRONIC PAIN OF RIGHT KNEE: Primary | ICD-10-CM

## 2022-12-15 DIAGNOSIS — G89.29 CHRONIC PAIN OF RIGHT KNEE: Primary | ICD-10-CM

## 2022-12-19 ENCOUNTER — ANCILLARY PROCEDURE (OUTPATIENT)
Dept: MRI IMAGING | Facility: CLINIC | Age: 22
End: 2022-12-19
Attending: FAMILY MEDICINE
Payer: COMMERCIAL

## 2022-12-19 DIAGNOSIS — G89.29 CHRONIC PAIN OF RIGHT KNEE: ICD-10-CM

## 2022-12-19 DIAGNOSIS — M25.561 CHRONIC PAIN OF RIGHT KNEE: ICD-10-CM

## 2022-12-29 DIAGNOSIS — N39.0 URINARY TRACT INFECTION WITHOUT HEMATURIA, SITE UNSPECIFIED: Primary | ICD-10-CM

## 2022-12-29 RX ORDER — SULFAMETHOXAZOLE/TRIMETHOPRIM 800-160 MG
1 TABLET ORAL 2 TIMES DAILY
Qty: 10 TABLET | Refills: 0 | Status: SHIPPED | OUTPATIENT
Start: 2022-12-29 | End: 2023-02-16

## 2023-02-03 ENCOUNTER — OFFICE VISIT (OUTPATIENT)
Dept: ORTHOPEDICS | Facility: CLINIC | Age: 23
End: 2023-02-03
Payer: COMMERCIAL

## 2023-02-03 DIAGNOSIS — Q74.1 BIPARTITE PATELLA: Primary | ICD-10-CM

## 2023-02-03 NOTE — LETTER
2/3/2023    RE: Smitha Saeman  [address] [place] [address]     Dear Colleague,    Thank you for referring your patient, Smitha Seaman, to the [facility]. Please see a copy of my visit note below.    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    She had for the tendinopathy during her intercollegiate career.    An MRI was obtained on her right knee in December 2020.  Is significant for bipartite patella with fluid and cystic's with nonunion.  Patella tendinopathy is present without any intersubstance tearing.    An MRI of her Left knee in March 2021 was also reviewed.  It shows a bipartate patalla in one large segment, widened in its articulation with the patella, no fluid line seen.  NO patellar tendinopathy is present.    In discussion with patient today, she reports her pain is superior lateral around her kneecap.      On exam: Palpable pain is superolateral and at the prox patella tendon, without any crepitus.   Right > Left    Imaging:  Right patella: bipartate patella is in 3+ fragments, w/ cyst formation at articulation.  Diagonal diameter 30mm, prox. 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  This would necessitate a LRL of sorts, with retaining the lateral retinacular attachment to the patellar periosteum.    Room time: 30 min, Consultation time: 20 min, mostly spent discussing the diagnosis and surgical  recommendation, including potential complications and time line for return to function..    Poonam Hassan MD  Professor Orthopedic Surgery  [facility]

## 2023-02-09 ENCOUNTER — TELEPHONE (OUTPATIENT)
Dept: ORTHOPEDICS | Facility: CLINIC | Age: 23
End: 2023-02-09

## 2023-02-09 NOTE — TELEPHONE ENCOUNTER
Phoned patient to confirm surgery date with Dr. Hassan     Patient was unavailable,   Provided call back number in voicemail:   [phone].

## 2023-02-12 NOTE — PROGRESS NOTES
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    She had for the tendinopathy during her intercollegiate career.    An MRI was obtained on her right knee in December 2020.  Is significant for bipartite patella with fluid and cystic's with nonunion.  Patella tendinopathy is present without any intersubstance tearing.    An MRI of her Left knee in March 2021 was also reviewed.  It shows a bipartate patalla in one large segment, widened in its articulation with the patella, no fluid line seen.  NO patellar tendinopathy is present.    In discussion with patient today, she reports her pain is superior lateral around her kneecap.      On exam: Palpable pain is superolateral and at the prox patella tendon, without any crepitus.   Right > Left    Imaging:  Right patella: bipartate patella is in 3+ fragments, w/ cyst formation at articulation.  Diagonal diameter 30mm, prox. 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  This would necessitate a LRL of sorts, with retaining the lateral retinacular attachment to the patellar periosteum.    Room time: 30 min, Consultation time: 20 min, mostly spent discussing the diagnosis and surgical recommendation, including potential complications and time line for return to function..    Poonam Hassan MD  Professor Orthopedic Surgery  [facility]

## 2023-02-14 ENCOUNTER — TELEPHONE (OUTPATIENT)
Dept: ORTHOPEDICS | Facility: CLINIC | Age: 23
End: 2023-02-14
Payer: COMMERCIAL

## 2023-02-14 DIAGNOSIS — M76.51 PATELLAR TENDINITIS OF RIGHT KNEE: Primary | ICD-10-CM

## 2023-02-14 NOTE — TELEPHONE ENCOUNTER
Teaching Flowsheet   Relevant Diagnosis:   Teaching Topic: right knee arthroscopy with excision of assessory patellar ossicle, lateral retinacular lengthening     Person(s) involved in teaching:   Patient     Motivation Level:  Asks Questions: Yes  Eager to Learn: Yes  Cooperative: Yes  Receptive (willing/able to accept information): Yes  Any cultural factors/Anabaptist beliefs that may influence understanding or compliance? No  Comments: none     Patient demonstrates understanding of the following:  Reason for the appointment, diagnosis and treatment plan: Yes  Knowledge of proper use of medications and conditions for which they are ordered (with special attention to potential side effects or drug interactions): Yes  Which situations necessitate calling provider and whom to contact: Yes       Teaching Concerns Addressed:   Comments: none     Proper use and care of (medical equip, care aids, etc.): Yes  Nutritional needs and diet plan: Yes  Pain management techniques: Yes  Wound Care: Yes  How and/when to access community resources: Yes     Instructional Materials Used/Given: surgery packet (sent in mail today), chlorhexidine soap, stoplight tool     Time spent with patient: 15 minutes.    Lucero Troncoso RN on 2/14/2023 at 4:44 PM

## 2023-02-15 NOTE — TELEPHONE ENCOUNTER
FUTURE VISIT INFORMATION      SURGERY INFORMATION:    Date: 3/2/23    Location:  or    Surgeon:  Poonam Hassan MD    Anesthesia Type:  choice    Procedure: right knee arthroscopy with excision of assessory patellar ossicle, lateral retinacular lengthening    Consult: ov 2/3    RECORDS REQUESTED FROM:       Primary Care Provider: Janna Caba MD- [facility]    Most recent EKG+ Tracin21    Most recent ECHO: 10/21/20

## 2023-02-16 ENCOUNTER — ANESTHESIA EVENT (OUTPATIENT)
Dept: SURGERY | Facility: AMBULATORY SURGERY CENTER | Age: 23
End: 2023-02-16
Payer: COMMERCIAL

## 2023-02-16 ENCOUNTER — VIRTUAL VISIT (OUTPATIENT)
Dept: SURGERY | Facility: CLINIC | Age: 23
End: 2023-02-16
Payer: COMMERCIAL

## 2023-02-16 ENCOUNTER — PRE VISIT (OUTPATIENT)
Dept: SURGERY | Facility: CLINIC | Age: 23
End: 2023-02-16

## 2023-02-16 DIAGNOSIS — Q74.1 BIPARTITE PATELLA: ICD-10-CM

## 2023-02-16 DIAGNOSIS — Z01.818 PREOP EXAMINATION: Primary | ICD-10-CM

## 2023-02-16 PROCEDURE — 99203 OFFICE O/P NEW LOW 30 MIN: CPT | Mod: VID | Performed by: CLINICAL NURSE SPECIALIST

## 2023-02-16 ASSESSMENT — ENCOUNTER SYMPTOMS
DYSRHYTHMIAS: 0
SEIZURES: 0

## 2023-02-16 ASSESSMENT — LIFESTYLE VARIABLES: TOBACCO_USE: 0

## 2023-02-16 NOTE — PROGRESS NOTES
CC is a 22 year old who is being evaluated via a billable video visit.      How would you like to obtain your AVS? Jesi          Subjective   CC is a 22 year old;  presenting for the following health issues:  Pre-Op Exam (/)      HPI       Review of Systems         Physical Exam     MALU Arroyo LPN

## 2023-02-16 NOTE — H&P
Pre-Operative H & P     CC:  Preoperative exam to assess for increased cardiopulmonary risk while undergoing surgery and anesthesia.    Date of Encounter: 2/16/2023  Primary Care Physician:  Nikolas [employer]     Reason for visit:   Encounter Diagnoses   Name Primary?     Preop examination Yes     Bipartite patella        HPI  Smitha Seaman is a 22 year old female who presents for pre-operative H & P in preparation for  Procedure Information     Case: [id number] Date/Time: 03/02/23 0715    Procedure: right knee arthroscopy with excision of assessory patellar ossicle, lateral retinacular lengthening (Right: Knee)    Anesthesia type: Choice    Diagnosis: Bipartite patella [Q74.1]    Pre-op diagnosis: Bipartite patella [Q74.1]    Location: [place] [address] / [facility]    Providers: Poonam Hassan MD        History is obtained from the patient and chart review    Intercollegiate  who has been followed by Dr. Hassan. She was recently evaluated for bilateral knee pain, right greater than left. Her work up revealed right knee symptomatic bipartate patella secondary to non-union, and she was counseled for above procedures.     Her history is otherwise significant for anxiety. She has had bilateral ankle surgeries in past.     Hx of abnormal bleeding or anti-platelet use: Denies     Menstrual history: Patient's last menstrual period was 02/09/2023. LMP 2/9/23    Past Medical History  Past Medical History:   Diagnosis Date     Anxiety      Bipartite patella 02/09/2023       Past Surgical History  Past Surgical History:   Procedure Laterality Date     ORTHOPEDIC SURGERY      bilateral ankle surgeries     [place] teeth extraction          Prior to Admission Medications  Current Outpatient Medications   Medication Sig Dispense Refill     Bioflavonoid Products (VITAMIN C) CHEW Take by mouth as needed       ferrous sulfate (SLO-FE) 142 (45 Fe)  MG CR tablet Take 1 tablet (142 mg) by mouth daily (Patient taking differently: Take 142 mg by mouth every morning) 93 tablet 1     sertraline (ZOLOFT) 100 MG tablet Take 1 tablet (100 mg) by mouth daily (Patient taking differently: Take 100 mg by mouth every morning) 93 tablet 1       Allergies  No Known Allergies    Social History  Social History     Socioeconomic History     Marital status: Single     Spouse name: Not on file     Number of children: Not on file     Years of education: Not on file     Highest education level: Not on file   Occupational History     Not on file   Tobacco Use     Smoking status: Never     Smokeless tobacco: Never   Substance and Sexual Activity     Alcohol use: Yes     Drug use: Never     Sexual activity: Not on file   Other Topics Concern     Not on file   Social History Narrative     Not on file     Social Determinants of Health     Financial Resource Strain: Not on file   Food Insecurity: Not on file   Transportation Needs: Not on file   Physical Activity: Not on file   Stress: Not on file   Social Connections: Not on file   Intimate Partner Violence: Not on file   Housing Stability: Not on file       Family History  Family History   Problem Relation Age of Onset     Cancer Maternal Grandfather      Cancer Paternal Grandfather      Anesthesia Reaction No family hx of      Clotting Disorder No family hx of        Review of Systems  The complete review of systems is negative other than noted in the HPI or here.   Anesthesia Evaluation   Pt has had prior anesthetic. Type: General and MAC.    No history of anesthetic complications       ROS/MED HX  ENT/Pulmonary:    (-) tobacco use and recent URI   Neurologic:    (-) no seizures   Cardiovascular:     (+) -----Previous cardiac testing   Echo: Date: 2020 Results:    Stress Test: Date: Results:    ECG Reviewed: Date: 12/7/21 Results:  SB  Cath: Date: Results:   (-) taking anticoagulants/antiplatelets, HERNANDEZ and arrhythmias   METS/Exercise  Tolerance: >4 METS Comment: Intercollegiate     Hematologic:  - neg hematologic  ROS  (-) history of blood clots and history of blood transfusion   Musculoskeletal: Comment: Bipartite patella      GI/Hepatic:    (-) GERD   Renal/Genitourinary:  - neg Renal ROS     Endo:  - neg endo ROS     Psychiatric/Substance Use:     (+) psychiatric history anxiety     Infectious Disease:  - neg infectious disease ROS     Malignancy:  - neg malignancy ROS     Other:  - neg other ROS    (+) LMP: 2/9/23, ,         Virtual visit -  No vitals were obtained    Physical Exam  Constitutional: Awake, alert, no apparent distress, and appears stated age.  Eyes: Pupils equal  HENT: Normocephalic  Respiratory: non labored breathing   Neurologic: Oriented to name, place and time.   Neuropsychiatric: Calm, cooperative. Normal affect.      Prior Labs/Diagnostic Studies   All labs and imaging personally reviewed      Lab Results   Component Value Date    WBC 5.9 03/02/2022    WBC 6.3 05/29/2020     Lab Results   Component Value Date    RBC 4.30 03/02/2022    RBC 4.28 05/29/2020     Lab Results   Component Value Date    HGB 13.1 03/02/2022    HGB 13.2 05/29/2020     Lab Results   Component Value Date    HCT 40.0 03/02/2022    HCT 41.1 05/29/2020     Lab Results   Component Value Date    MCV 93 03/02/2022    MCV 96 05/29/2020     Lab Results   Component Value Date    MCH 30.5 03/02/2022    MCH 30.8 05/29/2020     Lab Results   Component Value Date    MCHC 32.8 03/02/2022    MCHC 32.1 05/29/2020     Lab Results   Component Value Date    RDW 12.2 03/02/2022    RDW 12.2 05/29/2020     Lab Results   Component Value Date     03/02/2022     05/29/2020     Last Comprehensive Metabolic Panel:  Sodium   Date Value Ref Range Status   03/02/2022 143 133 - 144 mmol/L Final   04/24/2019 140 133 - 144 mmol/L Final     Potassium   Date Value Ref Range Status   03/02/2022 3.9 3.4 - 5.3 mmol/L Final   04/24/2019 3.9 3.4 - 5.3 mmol/L  Final     Chloride   Date Value Ref Range Status   2022 108 94 - 109 mmol/L Final   2019 106 96 - 110 mmol/L Final     Carbon Dioxide   Date Value Ref Range Status   2019 25 20 - 32 mmol/L Final     Carbon Dioxide (CO2)   Date Value Ref Range Status   2022 30 20 - 32 mmol/L Final     Anion Gap   Date Value Ref Range Status   2022 5 3 - 14 mmol/L Final   2019 9 3 - 14 mmol/L Final     Glucose   Date Value Ref Range Status   2022 90 70 - 99 mg/dL Final   2019 108 (H) 70 - 99 mg/dL Final     Urea Nitrogen   Date Value Ref Range Status   2022 14 7 - 30 mg/dL Final   2019 15 7 - 30 mg/dL Final     Creatinine   Date Value Ref Range Status   2022 0.78 0.52 - 1.04 mg/dL Final   2019 0.77 0.50 - 1.00 mg/dL Final     GFR Estimate   Date Value Ref Range Status   2022 >90 >60 mL/min/1.73m2 Final     Comment:     Effective 2021 eGFRcr in adults is calculated using the  CKD-EPI creatinine equation which includes age and gender (Mesfin et al., NEJ, DOI: 10.1056/GLBFmv8762124)   2019 >90 >60 mL/min/[1.73_m2] Final     Comment:     Non  GFR Calc  Starting 2018, serum creatinine based estimated GFR (eGFR) will be   calculated using the Chronic Kidney Disease Epidemiology Collaboration   (CKD-EPI) equation.       Calcium   Date Value Ref Range Status   2022 8.8 8.5 - 10.1 mg/dL Final   2019 9.0 8.5 - 10.1 mg/dL Final     Bilirubin Total   Date Value Ref Range Status   2022 0.2 0.2 - 1.3 mg/dL Final   2019 0.3 0.2 - 1.3 mg/dL Final     Alkaline Phosphatase   Date Value Ref Range Status   2022 68 40 - 150 U/L Final   2019 59 40 - 150 U/L Final     ALT   Date Value Ref Range Status   2022 17 0 - 50 U/L Final   2019 15 0 - 50 U/L Final     AST   Date Value Ref Range Status   2022 14 0 - 45 U/L Final   2019 9 0 - 35 U/L Final       EK21 Sinus  bradycardia    Echocardiogram 2020  Interpretation Summary  Global and regional left ventricular function is normal with an EF of 60-65%.  The right ventricle is normal size. Global right ventricular function is  normal.  No significant valvular abnormalities were noted.    MR Cardiac 2020  Clinical history: Evaluate for SARS-COV-2 myocarditis  Comparison CMR: None     1. The LV is normal in cavity size and wall thickness. The global systolic function is normal. The LVEF is  65%. There are no regional wall motion abnormalities.     2. The RV is normal in cavity size. The global systolic function is normal. The RVEF is 60%.      3. Both atria are normal in size.     4. There is no significant valvular disease.      5. There is no evidence of myocardial edema on T2 weighted imaging. There is no evidence of myocardial iron  overload.      6. Late gadolinium enhancement imaging shows no MI, fibrosis or infiltrative disease.      CONCLUSIONS: Normal bi-ventricular size and systolic function. No late enhancement to suggest prior  infarction, inflammation or infiltration.       12/19/22 MR right knee  Impression:  1. Meniscocapsular junction fluid signal with small intrameniscal  cysts at the posterior aspect of the meniscal body/horn junction,  query partial ramp type lesion.   2. Presumed multipartite patella with opposing edema-like marrow  signal intensity, cystlike changes and regional soft tissue edema,  possibly source of pain.  3.  Patellar tendon tendinosis.  4. Query mild pes anserine bursitis.       The patient's records and results personally reviewed by this provider.     Outside records reviewed from: Care Everywhere  Assessment      Smitha Seaman is a 22 year old female seen as a PAC referral for risk assessment and optimization for anesthesia.    Plan/Recommendations  Pt will be optimized for the proposed procedure.  See below for details on the assessment, risk, and preoperative  "recommendations    NEUROLOGY  - No history of TIA, CVA or seizure    -Post Op delirium risk factors:  No risk identified    ENT  - No current airway concerns.  Will need to be reassessed day of surgery.  Mallampati: Unable to assess  TM: Unable to assess   Lower permanent retainer    CARDIAC  No cardiac history, symptoms or meds. Good exercise tolerance. BP range in clinic 117-120s/60-80s.    - METS (Metabolic Equivalents)>4    RCRI: 0.4% risk of serious cardiac events    PULMONARY  Denies asthma, cough or shortness of breath  Low risk for NATASHA  - Tobacco History      History   Smoking Status     Never   Smokeless Tobacco     Never       GI: Denies GERD  PONV Medium Risk  Total Score: 2           1 AN PONV: Pt is Female    1 AN PONV: Patient is not a current smoker        /RENAL  - Baseline Creatinine 0.78    ENDOCRINE   - BMI: Estimated body mass index is 20.47 kg/m  as calculated from the following:    Height as of 12/13/22: 1.753 m (5' 9\").    Weight as of 12/13/22: 62.9 kg (138 lb 9.6 oz).  Healthy Weight (BMI 18.5-24.9)  - No history of Diabetes Mellitus    HEME  VTE Low Risk 0.26%            Total Score: 0      Denies personal or family history of blood clots  Denies history of blood transfusion   Has been taking iron supplement during time she played volleyball. Hgb has been in 13 range    MSK: Bilateral knee pain, right greater than left  Bipartite patella    PSYCH  - Anxiety. Will take sertraline on DOS.    Final plan will be decided by the assigned anesthesia provider on the date of service.    The patient is optimized for their procedure. AVS with information on surgery time/arrival time, meds and NPO status given by nursing staff. No further diagnostic testing indicated.    Please refer to the physical examination documented by the anesthesiologist in the anesthesia record on the day of surgery.    Video-Visit Details    Type of service:  Video Visit    Provider received verbal consent for a Video Visit " from the patient? Yes     Originating Location (pt. Location): Home    Distant Location (provider location):  Off-site  Mode of Communication:  Video Conference via [employer]ium  On the day of service:     Prep time: 10 minutes  Visit time: 6 minutes  Documentation time: 15 minutes  ------------------------------------------  Total time: 31 minutes      JODY Logan CNS  Preoperative Assessment Center  [facility]  Clinic and Surgery Center  Phone: [phone]  Fax: [phone]
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
## 2023-03-01 RX ORDER — FENTANYL CITRATE 50 UG/ML
50 INJECTION, SOLUTION INTRAMUSCULAR; INTRAVENOUS EVERY 5 MIN PRN
Status: CANCELLED | OUTPATIENT
Start: 2023-03-01

## 2023-03-01 RX ORDER — HYDROMORPHONE HYDROCHLORIDE 1 MG/ML
0.2 INJECTION, SOLUTION INTRAMUSCULAR; INTRAVENOUS; SUBCUTANEOUS EVERY 5 MIN PRN
Status: CANCELLED | OUTPATIENT
Start: 2023-03-01

## 2023-03-01 RX ORDER — SODIUM CHLORIDE, SODIUM LACTATE, POTASSIUM CHLORIDE, CALCIUM CHLORIDE 600; 310; 30; 20 MG/100ML; MG/100ML; MG/100ML; MG/100ML
INJECTION, SOLUTION INTRAVENOUS CONTINUOUS
Status: CANCELLED | OUTPATIENT
Start: 2023-03-01

## 2023-03-01 RX ORDER — OXYCODONE HYDROCHLORIDE 5 MG/1
5 TABLET ORAL
Status: CANCELLED | OUTPATIENT
Start: 2023-03-01

## 2023-03-01 RX ORDER — FENTANYL CITRATE 50 UG/ML
25 INJECTION, SOLUTION INTRAMUSCULAR; INTRAVENOUS EVERY 5 MIN PRN
Status: CANCELLED | OUTPATIENT
Start: 2023-03-01

## 2023-03-01 RX ORDER — HYDROMORPHONE HYDROCHLORIDE 1 MG/ML
0.4 INJECTION, SOLUTION INTRAMUSCULAR; INTRAVENOUS; SUBCUTANEOUS EVERY 5 MIN PRN
Status: CANCELLED | OUTPATIENT
Start: 2023-03-01

## 2023-03-01 RX ORDER — ONDANSETRON 4 MG/1
4 TABLET, ORALLY DISINTEGRATING ORAL EVERY 30 MIN PRN
Status: CANCELLED | OUTPATIENT
Start: 2023-03-01

## 2023-03-01 RX ORDER — LABETALOL HYDROCHLORIDE 5 MG/ML
10 INJECTION, SOLUTION INTRAVENOUS
Status: CANCELLED | OUTPATIENT
Start: 2023-03-01

## 2023-03-01 RX ORDER — OXYCODONE HYDROCHLORIDE 5 MG/1
10 TABLET ORAL
Status: CANCELLED | OUTPATIENT
Start: 2023-03-01

## 2023-03-01 RX ORDER — ONDANSETRON 2 MG/ML
4 INJECTION INTRAMUSCULAR; INTRAVENOUS EVERY 30 MIN PRN
Status: CANCELLED | OUTPATIENT
Start: 2023-03-01

## 2023-03-02 ENCOUNTER — HOSPITAL ENCOUNTER (OUTPATIENT)
Facility: AMBULATORY SURGERY CENTER | Age: 23
Discharge: HOME | End: 2023-03-02
Attending: ORTHOPAEDIC SURGERY
Payer: COMMERCIAL

## 2023-03-02 ENCOUNTER — ANESTHESIA (OUTPATIENT)
Dept: SURGERY | Facility: AMBULATORY SURGERY CENTER | Age: 23
End: 2023-03-02
Payer: COMMERCIAL

## 2023-03-02 VITALS
RESPIRATION RATE: 16 BRPM | WEIGHT: 140 LBS | DIASTOLIC BLOOD PRESSURE: 71 MMHG | HEIGHT: 69 IN | SYSTOLIC BLOOD PRESSURE: 115 MMHG | OXYGEN SATURATION: 98 % | TEMPERATURE: 98.1 F | HEART RATE: 66 BPM | BODY MASS INDEX: 20.73 KG/M2

## 2023-03-02 DIAGNOSIS — Q74.1 BIPARTITE PATELLA: ICD-10-CM

## 2023-03-02 LAB
HCG UR QL: NEGATIVE
INTERNAL QC OK POCT: NORMAL
POCT KIT EXPIRATION DATE: NORMAL
POCT KIT LOT NUMBER: NORMAL

## 2023-03-02 PROCEDURE — C9290 INJ, BUPIVACAINE LIPOSOME: HCPCS

## 2023-03-02 PROCEDURE — 81025 URINE PREGNANCY TEST: CPT | Performed by: PATHOLOGY

## 2023-03-02 PROCEDURE — 27350 REMOVAL OF KNEECAP: CPT | Mod: RT

## 2023-03-02 PROCEDURE — 27350 REMOVAL OF KNEECAP: CPT | Mod: RT | Performed by: ORTHOPAEDIC SURGERY

## 2023-03-02 RX ORDER — PROPOFOL 10 MG/ML
INJECTION, EMULSION INTRAVENOUS PRN
Status: DISCONTINUED | OUTPATIENT
Start: 2023-03-02 | End: 2023-03-02

## 2023-03-02 RX ORDER — AMOXICILLIN 250 MG
1-2 CAPSULE ORAL 2 TIMES DAILY
Qty: 30 TABLET | Refills: 0 | Status: SHIPPED | OUTPATIENT
Start: 2023-03-02 | End: 2023-03-14

## 2023-03-02 RX ORDER — BUPIVACAINE HYDROCHLORIDE 2.5 MG/ML
INJECTION, SOLUTION EPIDURAL; INFILTRATION; INTRACAUDAL
Status: COMPLETED | OUTPATIENT
Start: 2023-03-02 | End: 2023-03-02

## 2023-03-02 RX ORDER — CEFAZOLIN SODIUM 2 G/50ML
2 SOLUTION INTRAVENOUS SEE ADMIN INSTRUCTIONS
Status: DISCONTINUED | OUTPATIENT
Start: 2023-03-02 | End: 2023-03-03 | Stop reason: HOSPADM

## 2023-03-02 RX ORDER — OXYCODONE HYDROCHLORIDE 5 MG/1
5 TABLET ORAL
Status: CANCELLED | OUTPATIENT
Start: 2023-03-02

## 2023-03-02 RX ORDER — BUPIVACAINE HYDROCHLORIDE 2.5 MG/ML
INJECTION, SOLUTION INFILTRATION; PERINEURAL PRN
Status: DISCONTINUED | OUTPATIENT
Start: 2023-03-02 | End: 2023-03-02 | Stop reason: HOSPADM

## 2023-03-02 RX ORDER — ACETAMINOPHEN 325 MG/1
650 TABLET ORAL
Status: CANCELLED | OUTPATIENT
Start: 2023-03-02

## 2023-03-02 RX ORDER — DEXAMETHASONE SODIUM PHOSPHATE 4 MG/ML
INJECTION, SOLUTION INTRA-ARTICULAR; INTRALESIONAL; INTRAMUSCULAR; INTRAVENOUS; SOFT TISSUE PRN
Status: DISCONTINUED | OUTPATIENT
Start: 2023-03-02 | End: 2023-03-02

## 2023-03-02 RX ORDER — LIDOCAINE HYDROCHLORIDE 20 MG/ML
INJECTION, SOLUTION INFILTRATION; PERINEURAL PRN
Status: DISCONTINUED | OUTPATIENT
Start: 2023-03-02 | End: 2023-03-02

## 2023-03-02 RX ORDER — FLUMAZENIL 0.1 MG/ML
0.2 INJECTION, SOLUTION INTRAVENOUS
Status: DISCONTINUED | OUTPATIENT
Start: 2023-03-02 | End: 2023-03-03 | Stop reason: HOSPADM

## 2023-03-02 RX ORDER — OXYCODONE HYDROCHLORIDE 5 MG/1
5-10 TABLET ORAL EVERY 4 HOURS PRN
Qty: 12 TABLET | Refills: 0 | Status: SHIPPED | OUTPATIENT
Start: 2023-03-02 | End: 2023-03-14

## 2023-03-02 RX ORDER — CEFAZOLIN SODIUM 2 G/50ML
2 SOLUTION INTRAVENOUS
Status: COMPLETED | OUTPATIENT
Start: 2023-03-02 | End: 2023-03-02

## 2023-03-02 RX ORDER — FENTANYL CITRATE 50 UG/ML
25-50 INJECTION, SOLUTION INTRAMUSCULAR; INTRAVENOUS
Status: DISCONTINUED | OUTPATIENT
Start: 2023-03-02 | End: 2023-03-03 | Stop reason: HOSPADM

## 2023-03-02 RX ORDER — PROPOFOL 10 MG/ML
INJECTION, EMULSION INTRAVENOUS CONTINUOUS PRN
Status: DISCONTINUED | OUTPATIENT
Start: 2023-03-02 | End: 2023-03-02

## 2023-03-02 RX ORDER — NALOXONE HYDROCHLORIDE 0.4 MG/ML
0.2 INJECTION, SOLUTION INTRAMUSCULAR; INTRAVENOUS; SUBCUTANEOUS
Status: DISCONTINUED | OUTPATIENT
Start: 2023-03-02 | End: 2023-03-03 | Stop reason: HOSPADM

## 2023-03-02 RX ORDER — ONDANSETRON 2 MG/ML
INJECTION INTRAMUSCULAR; INTRAVENOUS PRN
Status: DISCONTINUED | OUTPATIENT
Start: 2023-03-02 | End: 2023-03-02

## 2023-03-02 RX ORDER — KETOROLAC TROMETHAMINE 30 MG/ML
INJECTION, SOLUTION INTRAMUSCULAR; INTRAVENOUS PRN
Status: DISCONTINUED | OUTPATIENT
Start: 2023-03-02 | End: 2023-03-02

## 2023-03-02 RX ORDER — NALOXONE HYDROCHLORIDE 0.4 MG/ML
0.4 INJECTION, SOLUTION INTRAMUSCULAR; INTRAVENOUS; SUBCUTANEOUS
Status: DISCONTINUED | OUTPATIENT
Start: 2023-03-02 | End: 2023-03-03 | Stop reason: HOSPADM

## 2023-03-02 RX ORDER — ACETAMINOPHEN 325 MG/1
975 TABLET ORAL ONCE
Status: DISCONTINUED | OUTPATIENT
Start: 2023-03-02 | End: 2023-03-03 | Stop reason: HOSPADM

## 2023-03-02 RX ORDER — LIDOCAINE 40 MG/G
CREAM TOPICAL
Status: DISCONTINUED | OUTPATIENT
Start: 2023-03-02 | End: 2023-03-03 | Stop reason: HOSPADM

## 2023-03-02 RX ORDER — ACETAMINOPHEN 325 MG/1
650 TABLET ORAL EVERY 4 HOURS PRN
Qty: 50 TABLET | Refills: 0 | Status: SHIPPED | OUTPATIENT
Start: 2023-03-02 | End: 2024-04-30

## 2023-03-02 RX ORDER — SODIUM CHLORIDE, SODIUM LACTATE, POTASSIUM CHLORIDE, CALCIUM CHLORIDE 600; 310; 30; 20 MG/100ML; MG/100ML; MG/100ML; MG/100ML
INJECTION, SOLUTION INTRAVENOUS CONTINUOUS
Status: DISCONTINUED | OUTPATIENT
Start: 2023-03-02 | End: 2023-03-03 | Stop reason: HOSPADM

## 2023-03-02 RX ORDER — ACETAMINOPHEN 325 MG/1
975 TABLET ORAL ONCE
Status: COMPLETED | OUTPATIENT
Start: 2023-03-02 | End: 2023-03-02

## 2023-03-02 RX ORDER — ONDANSETRON 4 MG/1
4 TABLET, ORALLY DISINTEGRATING ORAL
Status: CANCELLED | OUTPATIENT
Start: 2023-03-02

## 2023-03-02 RX ADMIN — KETOROLAC TROMETHAMINE 30 MG: 30 INJECTION, SOLUTION INTRAMUSCULAR; INTRAVENOUS at 09:17

## 2023-03-02 RX ADMIN — PROPOFOL 150 MG: 10 INJECTION, EMULSION INTRAVENOUS at 07:48

## 2023-03-02 RX ADMIN — FENTANYL CITRATE 75 MCG: 50 INJECTION, SOLUTION INTRAMUSCULAR; INTRAVENOUS at 07:04

## 2023-03-02 RX ADMIN — Medication 0.5 MG: at 08:43

## 2023-03-02 RX ADMIN — LIDOCAINE HYDROCHLORIDE 80 MG: 20 INJECTION, SOLUTION INFILTRATION; PERINEURAL at 07:48

## 2023-03-02 RX ADMIN — SODIUM CHLORIDE, SODIUM LACTATE, POTASSIUM CHLORIDE, CALCIUM CHLORIDE: 600; 310; 30; 20 INJECTION, SOLUTION INTRAVENOUS at 06:49

## 2023-03-02 RX ADMIN — ACETAMINOPHEN 975 MG: 325 TABLET ORAL at 06:31

## 2023-03-02 RX ADMIN — BUPIVACAINE HYDROCHLORIDE 10 ML: 2.5 INJECTION, SOLUTION EPIDURAL; INFILTRATION; INTRACAUDAL at 07:00

## 2023-03-02 RX ADMIN — ONDANSETRON 4 MG: 2 INJECTION INTRAMUSCULAR; INTRAVENOUS at 07:48

## 2023-03-02 RX ADMIN — CEFAZOLIN SODIUM 2 G: 2 SOLUTION INTRAVENOUS at 07:43

## 2023-03-02 RX ADMIN — DEXAMETHASONE SODIUM PHOSPHATE 4 MG: 4 INJECTION, SOLUTION INTRA-ARTICULAR; INTRALESIONAL; INTRAMUSCULAR; INTRAVENOUS; SOFT TISSUE at 07:48

## 2023-03-02 RX ADMIN — PROPOFOL 200 MCG/KG/MIN: 10 INJECTION, EMULSION INTRAVENOUS at 07:49

## 2023-03-02 NOTE — BRIEF OP NOTE
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  Complications: None.  Implants: * No implants in log *    SDS  PO pain control, rest, ice, elevation  ADAT  HKB at all times - LRL protocol  Home per PACU

## 2023-03-02 NOTE — OR NURSING
Patient received right side Adductor nerve block  with Exparel.    Fentanyl 75mcg. Tolerated procedure well.

## 2023-03-02 NOTE — ANESTHESIA POSTPROCEDURE EVALUATION
Patient: Smitha Seaman    Procedure: Procedure(s):  right knee arthroscopy with excision of accessory patellar ossicle x2       Anesthesia Type:  General    Note:  Disposition: Outpatient   Postop Pain Control: Uneventful            Sign Out: Well controlled pain   PONV: No   Neuro/Psych: Uneventful            Sign Out: Acceptable/Baseline neuro status   Airway/Respiratory: Uneventful            Sign Out: Acceptable/Baseline resp. status   CV/Hemodynamics: Uneventful            Sign Out: Acceptable CV status; No obvious hypovolemia; No obvious fluid overload   Other NRE: NONE   DID A NON-ROUTINE EVENT OCCUR? No           Last vitals:  Vitals Value Taken Time   /67 03/02/23 0945   Temp 36.7  C (98  F) 03/02/23 0945   Pulse 65 03/02/23 0945   Resp 16 03/02/23 0945   SpO2 98 % 03/02/23 0945       Electronically Signed By: Chester Milligan MD  March 2, 2023  9:55 AM

## 2023-03-02 NOTE — ANESTHESIA CARE TRANSFER NOTE
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    Anesthesia Type:   General     Note:    Oropharynx: oropharynx clear of all foreign objects and spontaneously breathing  Level of Consciousness: awake  Oxygen Supplementation: room air    Independent Airway: airway patency satisfactory and stable  Dentition: dentition unchanged  Vital Signs Stable: post-procedure vital signs reviewed and stable  Report to RN Given: handoff report given  Patient transferred to: PACU  Comments: Uneventful transport   Report to RN  Pt comfortable  Exchanging well; color natl  Pt responds appropriately to command  IV patent  Lips/teeth/dentition as preop status  Questions answered    Handoff Report: Identifed the Patient, Identified the Reponsible Provider, Reviewed the pertinent medical history, Discussed the surgical course, Reviewed Intra-OP anesthesia mangement and issues during anesthesia, Set expectations for post-procedure period and Allowed opportunity for questions and acknowledgement of understanding      Vitals:  Vitals Value Taken Time   /62 0936   Temp 97.8    Pulse 75 nsr    Resp 16    SpO2 98% room air        Electronically Signed By: JODY KRISHNAMURTHY CRNA  March 2, 2023  9:38 AM

## 2023-03-02 NOTE — ANESTHESIA PROCEDURE NOTES
Adductor canal Procedure Note    Pre-Procedure   Staff -        Anesthesiologist:  Chester Milligan MD       Resident/Fellow: Tai Ferreira MD       Performed By: with residents and resident       Procedure performed by resident/fellow/CRNA in presence of a teaching physician.         Location: pre-op       Procedure Start/Stop Times: 3/2/2023 7:00 AM and 3/2/2023 7:05 AM       Pre-Anesthestic Checklist: patient identified, IV checked, site marked, risks and benefits discussed, informed consent, monitors and equipment checked, pre-op evaluation, at physician/surgeon's request and post-op pain management  Timeout:       Correct Patient: Yes        Correct Procedure: Yes        Correct Site: Yes        Correct Position: Yes        Correct Laterality: Yes        Site Marked: Yes  Procedure Documentation  Procedure: Adductor canal       Laterality: right       Patient Position: supine       Patient Prep/Sterile Barriers: sterile gloves, mask       Skin prep: Chloraprep       Needle Type: short bevel       Needle Gauge: 21.        Needle Length (Inches): 3.13        Ultrasound guided       1. Ultrasound was used to identify targeted nerve, plexus, vascular marker, or fascial plane and place a needle adjacent to it in real-time.       2. Ultrasound was used to visualize the spread of anesthetic in close proximity to the above referenced structure.       3. A permanent image is entered into the patient's record.    Assessment/Narrative         The placement was negative for: blood aspirated, painful injection and site bleeding       Paresthesias: No.       Bolus given via needle. no blood aspirated via catheter.        Secured via.        Insertion/Infusion Method: Single Shot       Complications: none    Medication(s) Administered   Bupivacaine 0.25% PF (Infiltration) - Infiltration   10 mL - 3/2/2023 7:00:00 AM  Bupivacaine liposome (Exparel) 1.3% LA inj susp (Infiltration) - Infiltration   10 mL - 3/2/2023 7:00:00  "AM  Medication Administration Time: 3/2/2023 7:00 AM     Comments:  133mg of exparel was given during this nerve block      FOR [facility]) ONLY:   Pain Team Contact information: please page the Pain Team Via [employer]. Search \"Pain\". During daytime hours, please page the attending first. At night please page the resident first.    "

## 2023-03-02 NOTE — ANESTHESIA PREPROCEDURE EVALUATION
Anesthesia Pre-Procedure Evaluation    Patient: Smitha Seaman   MRN: [id number] : 2000        Procedure : Procedure(s):  right knee arthroscopy with excision of assessory patellar ossicle, lateral retinacular lengthening          Past Medical History:   Diagnosis Date     Anxiety      Bipartite patella 2023      Past Surgical History:   Procedure Laterality Date     ORTHOPEDIC SURGERY      bilateral ankle surgeries     [place] teeth extraction         No Known Allergies   Social History     Tobacco Use     Smoking status: Never     Smokeless tobacco: Never   Substance Use Topics     Alcohol use: Yes      Wt Readings from Last 1 Encounters:   23 63.5 kg (140 lb)           Physical Exam    Airway        Mallampati: II       Respiratory Devices and Support         Dental       (+) Completely normal teeth      Cardiovascular          Rhythm and rate: regular     Pulmonary           breath sounds clear to auscultation           OUTSIDE LABS:  CBC:   Lab Results   Component Value Date    WBC 5.9 2022    WBC 6.3 2020    HGB 13.1 2022    HGB 13.2 2020    HCT 40.0 2022    HCT 41.1 2020     2022     2020     BMP:   Lab Results   Component Value Date     2022     2019    POTASSIUM 3.9 2022    POTASSIUM 3.9 2019    CHLORIDE 108 2022    CHLORIDE 106 2019    CO2 30 2022    CO2 25 2019    BUN 14 2022    BUN 15 2019    CR 0.78 2022    CR 0.77 2019    GLC 90 2022     (H) 2019     COAGS:   Lab Results   Component Value Date    PTT 33 2019    INR 1.05 2019     POC:   Lab Results   Component Value Date    HCG Negative 2023     HEPATIC:   Lab Results   Component Value Date    ALBUMIN 3.8 2022    PROTTOTAL 6.9 2022    ALT 17 2022    AST 14 2022    ALKPHOS 68 2022    BILITOTAL 0.2 2022     OTHER:    Lab Results   Component Value Date    ASHLEIGH 8.8 03/02/2022       Anesthesia Plan    ASA Status:  1      Anesthesia Type: Peripheral Nerve Block.      Maintenance: TIVA.        Consents            Postoperative Care    Pain management: IV analgesics, Oral pain medications, Multi-modal analgesia.   PONV prophylaxis: Ondansetron (or other 5HT-3), Dexamethasone or Solumedrol     Comments:                Chester Milligan MD

## 2023-03-02 NOTE — DISCHARGE INSTRUCTIONS
LATERAL RETINACULAR LENGTHENING POST OPERATIVE DISCHARGE INSTRUCTIONS    FOLLOW UP APPOINTMENT  You are scheduled for a post operative wound check with Dr. Hassan's clinic approximately two weeks after surgery. At approximately eight weeks after surgery, you will see Dr. Hassan in clinic.     Your follow up appointments will be at the location that you regularly see Dr. Hassan:    [facility]  [address] [place] [id number]  [phone]    Physical therapy:   A referral for physical therapy will be made at discharge. You will also receive a physical therapy protocol in your after visit summary. This document must be taken to your first therapy visit.      ACTIVITY  Weight bearing status:   You may bear weight on your operative extremity as tolerated using assistive devices (crutches) as needed. The hinged knee brace should be on and locked at 10 degrees. Duration of crutch use is typically one to two weeks for indoor movement and two to four weeks for outdoor activity. Eventually, you should wean from all assistive devices. Although we would like you to discontinue use of assistive devices as soon as possible, do not transition until you have worked with your physical therapist to achieve safe balance and comfort.     Hinged knee brace:  The brace is to be worn for up to three to six weeks following surgery. It will be locked at 10 degrees initially, but gradually unlocked as quadriceps muscle control returns and pain is controlled. When directed by your doctor or physical therapist, you may unlock the brace while sitting but lock the brace before standing. Sleep with the brace on until directed.    Exercises:   Perform the following exercises at least three times per day for the first four weeks after surgery to prevent complications, such as blood clots in your legs:  1) Point and flex your feet  2) Move your ankle around in big circles  3) Wiggle your  toes   Also, perform thigh muscle tightening exercises for 10 to 15 minutes at least three times per day for the first four weeks after surgery.    Athletic Activities:  Activities such as swimming, bicycling, jogging, running, and stop-and-go sports should be avoided until permitted by your provider.    Driving:  Driving is not permitted until directed by your provider. Typically, driving is restricted for three to four weeks after right knee surgery and three weeks after left knee surgery. Under no circumstance are you permitted to drive while using narcotic pain medications.    Return to Work:  You may return to work when directed by your provider. Typically, patients with desk/sitting jobs can return to work within two weeks while patients with heavy labor jobs can return to work around three months after surgery.      COMFORT AND PAIN MANAGEMENT  Elevation:   During times of inactivity throughout the first two weeks after surgery, make an effort to decrease swelling by elevating your operative extremity. This is most effectively done by lying down and placing several pillows lengthwise under your thigh and calf to raise your toes above the level of your nose. To ensure that your knee remains in full extension, do not place pillows directly under your knee.     Icing:  An ice pack will be provided to control swelling and discomfort after surgery. Place a thin towel on your skin and apply the ice pack overtop. You may apply ice for 20 minutes as often as two times per hour.    Pain Medications:  You will be discharged with acetaminophen (Tylenol) and a narcotic medication for pain management after surgery. Acetaminophen is most effective when it is taken per the schedule outlined by your provider (every four, six, or eight hours as prescribed). You may safely use acetaminophen as prescribed for the first four weeks after surgery provided you do not exceed the maximum daily dose prescribed by your provider  (usually 3000 mg - 4000 mg). The narcotic pain medication should only be taken on an as-needed basis when necessary and should be reserved for severe pain that is not controlled with scheduled acetaminophen. In the first three days following surgery, your symptoms may warrant use of the narcotic pain medication every three, four, or six hours as prescribed. After three days, focus your efforts on decreasing (tapering) use of narcotic medications.   The most successful tapering strategy is to first, decrease the dose (number of tablets) and second, increase the interval (time in between doses). For example, if you begin taking two tablets every four hours after surgery, start your taper by decreasing one of these doses to one tablet. Every one to two days, decrease another dose to one tablet until you are eventually taking one tablet every four hours. Once this is achieved, focus on increasing the number of hours between doses, moving from one tablet every four hours to one tablet every six hours. As tolerated, continue to increase the interval to eight and twelve hours. Eventually, taper to one dose every evening and discontinue when no longer needed.      ANTICOAGULATION  Depending on your risk factors, your provider may prescribe aspirin to prevent blood clots. If prescribed, take aspirin daily for the first four weeks after surgery.      WOUND CARE AND SHOWERING  Wound care:  Remove compression dressing (tubigrip sleeve) twice daily for 10 minutes (to prevent skin breakdown).   Remove the compressive device for showering (see showering instructions below).   Discontinue Compression dressing two days after surgery-may be worn as needed for swelling control thereafter  After ten days, remove the dressing and leave the incision open to air, covering only for showering (see showering instructions below).   Your surgical incision was closed with Exofin (a surgical adhesive that is directly on the incision areas). The  Exofin should be left on until it falls off or is removed at your first office visit.   DO NOT pick or scratch your incision.   Please contact Dr. Hassan's office if you notice the following:   Significant cloudy, bloody, or malodorous drainage from the incision  Excessive warmth and redness around the incision.    Showering:  You may shower beginning two days after surgery, however, the surgical incision must remain dry until your first office visit.   Always remove the ACE wrap or tubigrip compression sleeve for showering.   During the first ten days after surgery, your surgical dressing will prevent the incision from becoming wet.   Once the surgical dressing is removed, cover the incision with saran wrap (or any other non-permeable covering) to keep the incision dry while showering.  You are strictly prohibited from soaking or submerging the surgical wound underwater.     Tub Bathing:  Tub bathing, swimming, or any other activities that cause your incision to be submerged should be avoided until allowed by your provider. Typically, patients are allowed to return to these activities six weeks after surgery.      CONTACTING YOUR PHYSICIAN:  You may experience symptoms that require follow-up before your scheduled two week appointment. Please contact Dr. Hassan's office if you experience:  1) Pain in your knee that persists or worsens in the first few days after surgery  2) Excessive redness or drainage of cloudy or bloody material from the wounds (clear red tinted fluid and some mild drainage should be expected) or drainage of any kind five days after surgery  3) A temperature elevation greater than 101.5 F   4) Pain, swelling or redness in your calf  5) Numbness or weakness in your leg or foot      Regular business hours (Monday - Friday, 8am - 5pm):  [facility]: [phone]    After hours and weekends:  [facility] on call Orthopedic resident: [phone]              Physical Therapy Post-Operative Guidelines  Dr. Poonam Hassan  Lateral Retinacular Lengthening    Phase I: 0-2 Weeks   Precautions: No patellar mobilizations; No OKC quad through arc of motion; No motion for first two days secondary to extensive vascularity of lateral retinaculum   Weight Bearing Brace ROM    WBAT with brace  Immobilize x 48 hours to prevent bleed    Wear when up locked at 10? KF   Use crutches only for comfort and symptom control  NO KF x 48 hours   0-60? KF progressively; AAROM, emphasize full extension   Do NOT force into painful end range KF - pain level 3/10 or less   Therapeutic Exercises and Activities    Edema control   Establish high quality quad set         *Promote superior translation of patella with contraction         *Avoid co-contraction with hamstrings or proximal hip musculature         *Utilize NMES as needed   SLR x 4         *Flexion: begin in standing à reclined standing à supine                -Progress per quad control, no extensor lag         *Abduction, Adduction, Extension   Beginner mat exercises for abdominal/lumbopelvic control and proximal hip strength   Calf raises   Goals: Quad set WNL; SLR without lag, ROM to 90?; Joint effusion resolving     Phase II: 2-6 Weeks   Precautions: Observe and correct for knee/hip alignment (functional valgus at knee and pelvic drop) with squatting and SLS activities; NO OKC strengthening through arc of motion x 6 weeks; Avoid end range quadriceps stretching until 8+ weeks post op; Do not encourage reciprocal stair climbing until adequate quad strength present   Weight Bearing Brace ROM    FWB with/without brace per quad control  Gradually open for gait per quad control; discontinue when quad strength is sufficient   Transition to knee sleeve for edema management once quadriceps strength sufficient  0-120? KF progressively   Full extension   Stationary bike   Therapeutic Exercises and Activities    Double legged  partial squats to 45? KF max with support or light leg press with double limb   Marching with balance moment   Initiate L/E proprio/balance drills: single limb per control/tolerance   Initiate bridging and planks   Increase repetitions w/proximal hip strength and abdominals   Goals: Effusion resolved; Preserve full extension; Flexion ROM ?120?; Normal gait pattern with progressive speed and distance as tolerated; Normal LE kinematics w/2 legged CKC activities; Multi-planar hip strength = MMT grade 5/5; Normal stair climbing       Phase III: 6-12 Weeks   Precautions: Observe and correct for knee/hip alignment in single leg squatting and functional movement patterns   Weight Bearing Brace ROM    FWB with normal gait pattern  Protective use when out of home: environmental hazards, crowds  Full, symmetrical ROM   Therapeutic Exercise and Activities    Initiate low impact cardio (15-20 min, minimal intensity - bike, walk, elliptical)         *Initiate cardio only if ROM, quad control, and symptoms are progressing well   Progress CKC drills (step, lunge, leg press)         *Deeper angles KF (?45?) w/2 limb support as tolerated         *Early KF angles (0-45?) w/1 limb per control/tolerance   Increase workload with 2 legged CKC drills         *Resistance with 2 legged squatting         *Progress depth with single limb (step, lunge, leg press)         *Initiate large muscle group weight training (HS curls, calf raises, deadlift, etc.)   Progress L/E proprio/balance drills: surface challenge, directional reaching, stepping   Goals: Able to perform 2 legged squat ?60? KF x 20 reps w/kinematic and symptom control; Restore normal mechanics with single leg CKC L/E activities; Able to maintain single leg balance ?60 seconds; Restore normal stair climbing       Phase IV: 12-16+ weeks*   Precautions: Observe and correct for soft, low squat landing with plyos maintaining good alignment at pelvis and knee; caution regarding patellar  tendinitis with progression of routine   Weight Bearing Brace ROM    FWB, no restrictions  Discontinue  Full, symmetrical ROM   Therapeutic Exercise and Activities    Progress resistance with 2 legged/1 legged strengthening drills   Introduce directional shuffling and agility footwork   Progress low impact cardio per symptoms; increase one variable at a time (intensity level, intervals, duration) - 15-20 min min minimal intensity, steady pace   Initiate return to run program if criteria met (see addendum)         *Observe for any increase in effusion or knee pain >24 hours post workout - reduce            running program intensity or frequency in response   Goals: Quad girth and strength returning; Good tolerance for normal walking speed and distance; Able to perform 2 legged squat to 90? KF x 20 reps & 1 legged squat ?45? KF x 20 reps with kinematic and symptom control     *Timeframes in later phases of rehabilitation are estimates only.  Patients may be progressed faster/slower based on their ability to attain goals for each phase. This is a functional return to activity.    Please reference Return to Sport or High Physical Demand Occupation Protocol for return to further advanced activities         [facility]  Home Care Following Anesthesia  For 24 hours after surgery:  Get plenty of rest.  A responsible adult must stay with you for at least 24 hours after you leave the surgery center.  Do not drive or use heavy equipment.  If you have weakness or tingling, don't drive or use heavy equipment until this feeling goes away.   Do not drink alcohol.   Avoid strenuous or risky activities.  Ask for help when climbing stairs.  You may feel lightheaded.  IF so, sit for a few minutes before standing.  Have someone help you get up.   If you have nausea (feel sick to your stomach): Drink only clear liquids such as apple juice, ginger ale, broth or 7-Up.  Rest may also help.  Be sure to  "drink enough fluids.  Move to a regular diet as you feel able.   You may have a slight fever.  Call the doctor if your fever is over 100 F (37.7 C) (taken under the tongue) or lasts longer than 24 hours.  You may have a dry mouth, a sore throat, muscle aches or trouble sleeping. These should go away after 24 hours.  Do not make important or legal decisions.   It is recommended to avoid smoking.        Today you received an Exparel block to numb the nerves near your surgery site.  This is a block using local anesthetic or \"numbing\" medication injected around the nerves to anesthetize or \"numb\" the area supplied by those nerves.  This block is injected into the muscle layer near your surgical site.  This medication may numb the location where you had surgery up to 72 hours.  If your surgical site is an arm or leg you should be careful with your affected limb, since it is possible to injure your limb without being aware of it due to the numbing.  Until full feeling returns, you should guard against bumping or hitting your limb, and avoid extreme hot or cold temperatures on the skin.  As the block wears off, the feeling will return as a tingling or prickly sensation near your surgical site.  You will experince more discomfort from your incision as the feeling returns.  You may want to take a pain pill (a narcotic or Tylenol if this was prescribed by your surgeon) when you start to experience mild pain before the pain beomes more severe.  If your pain medications do not control your pain, you should notify your surgeon.    Tips for taking pain medications  To get the best pain relief possible, remember these points:  Take pain medications as directed, before pain becomes severe.  Pain medication can upset your stomach: taking it with food may help.  Constipation is a common side effect of pain medication. Drink plenty of  fluids.  Eat foods high in fiber. Take a stool softener if recommended by your doctor or " pharmacist.  Do not drink alcohol, drive or operate machinery while taking pain medications.  Ask about other ways to control pain, such as with heat, ice or relaxation.    Tylenol/Acetaminophen Consumption  To help encourage the safe use of acetaminophen, the makers of TYLENOL  have lowered the maximum daily dose for single-ingredient Extra Strength TYLENOL  (acetaminophen) products sold in the [place] from 8 pills per day (4,000 mg) to 6 pills per day (3,000 mg). The dosing interval has also changed from 2 pills every 4-6 hours to 2 pills every 6 hours.  If you feel your pain relief is insufficient, you may take Tylenol/Acetaminophen in addition to your narcotic pain medication.   Be careful not to exceed 3,000 mg of Tylenol/Acetaminophen in a 24 hour period from all sources.  If you are taking extra strength Tylenol/acetaminophen (500 mg), the maximum dose is 6 tablets in 24 hours.  If you are taking regular strength acetaminophen (325 mg), the maximum dose is 9 tablets in 24 hours.  You received 975 mg of Tylenol at 6:31 am today. Next dose is available at 12:31 pm as needed per package instructions.    Call a doctor for any of the following:  Signs of infection (fever, growing tenderness at the surgery site, a large amount of drainage or bleeding, severe pain, foul-smelling drainage, redness, swelling).  It has been over 8 to 10 hours since surgery and you are still not able to urinate (pass water).  Headache for over 24 hours.  Numbness, tingling or weakness the day after surgery (if you had spinal anesthesia).  Signs of Covid-19 infection (temperature over 100 degrees, shortness of breath, cough, loss of taste/smell, generalized body aches, persistent headache, chills, sore throat, nausea/vomiting/diarrhea)  Your doctor is:  Dr. Poonam Hassan, Orthopaedics: [phone]                    Or dial [phone] and ask for the resident on call for:  Orthopaedics  For emergency care, call the:  [facility]  "Emergency Department: [phone] (TTY for hearing impaired: [phone])      Safety Tips for Using Crutches    Crutch Fit:  Assume good standing posture with shoulders relaxed and crutch tips 6-8 inches out from the side of the foot.  The underarm pad should fall 2-3 fingers width below the armpit.  The handgrip is positioned level with the wrist to allow 30  flexion at the elbow.    Safety Tips:  Bear weight on your hands, not on your armpits.  Do not add extra padding to the underarm pad. This will, in effect, lengthen the crutches and increase risk of nerve injury.  Wear flat, properly fitting shoes. Do not walk in stocking feet, high heels or slippers.  Household hazards:  --Throw rugs should be removed from floors.  --Stairs should be cleared of obstacles.  --Use extra caution on slippery, highly polished, littered or uneven floor surfaces.  --Check for electric cords.  Check crutch tips for excessive wear and keep wing nuts tight.  While walking, look forward with  head up  and  eyes open.  Take equal length steps.  Use BOTH crutches.    Stairs Sequence:  UP: \"Good\" leg first, followed by  bad  leg, then crutches.  DOWN: Crutches, followed by  bad  leg, \"good\" leg.     Walking with Crutches:  Move both crutches forward at the same time.  Non-Weight Bearing (NWB):  Hold the involved leg up and swing through the crutches with the involved leg. The involved leg does not touch the floor.  Toe Touch Weight Bearing (TTWB): Move the involved leg forward. Rest it lightly on the floor for balance only. Step through the crutches with the uninvolved leg.  Partial Weight Bearing (PWB): Move the involved leg forward. Step down the weight of the leg only.  Step through the crutches with the uninvolved leg.  Weight Bearing As Tolerated (WBAT): Move the involved leg forward. Put as much pressure through the involved leg as you can tolerate comfortably. Then step through the crutches with the uninvolved leg.                "

## 2023-03-02 NOTE — ADDENDUM NOTE
Addendum  created 03/02/23 1341 by Tai Ferreira MD    Clinical Note Signed, Diagnosis association updated, Intraprocedure Blocks edited, SmartForm saved

## 2023-03-03 ENCOUNTER — TELEPHONE (OUTPATIENT)
Dept: ORTHOPEDICS | Facility: CLINIC | Age: 23
End: 2023-03-03
Payer: COMMERCIAL

## 2023-03-03 NOTE — TELEPHONE ENCOUNTER
right knee arthroscopy with excision of accessory patellar ossicle x2 with Dr Sonya CONTE yesterday    Post op pain management - patient reports pain is very well managed    Post op wound concerns - denies bleeding or ssx of infection    Post op appointments scheduled and known to patient - no post ops scheduled.  Writer helped patient schedule 2 week post op with Stephanie.    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

## 2023-03-07 NOTE — OP NOTE
PREOPERATIVE DIAGNOSES:   1. Right knee accessory patella ossicles, superior lateral    POSTOPERATIVE DIAGNOSES:   1. Right knee patella ossicles x2  2.  Cartilage surfaces satisfactory patellofemoral joint  3. Pristine tibiofemoral joint.     OPERATIONS:   1.  Right  knee exam under anesthesia.   2. Diagnostic arthroscopy.   3.  Open excision of patella ossicle x2 (30 x 25 mm, 10 x 6 mm)    : Poonam Hassan MD; Donald Cooney MD  ANESTHESIA: Spinal Anesthesia supplemented with adductor nerve block medially and 20 ml bupivacaine laterally.     Exam under anesthesia of right knee revealed range of motion 4/0/147, left knee range of motion 3/0/146  Stable knee exam, stable knee Exam    Arthroscopic findings revealed no fluid upon entry into the joint.     Arthroscopic examination of the patellofemoral compartment showed clear signs of the demarcation of the superior lateral ossicle as evidenced by cartilage disruption along this fissure line.  Remaining cartilage surfaces of the patella was pristine as well as the trochlea cartilage.  Tibiofemoral joint showed satisfactory cartilage surfaces and normal meniscus      PROCEDURE: Under  General endotracheal anesthesia, the patient was positioned supine on the operating table. The patient's leg was prepped and draped in the usual sterile fashion. A pause was performed identifying the correct leg and the administration of antibiotics.   A diagnostic arthroscopy was performed using anterolateral portal for inflow and visualization. Findings as above.   Instruments were then removed from the knee. Tourniquet was elevated to 250 mmHg after Esmarch exsanguination of the leg. A lateral incision was used lateral to the patella approximately the length of the patella. This was carried down to lateral retinaculum.    We dissected up to uncover the use lateral aspect of the patella.  Using in the 18-gauge spinal needle we were able to identify the fissure from the  anterior aspect of the patella.  We then peeled back the retinaculum to expose the ossicle and excised the ossicle in its entirety.  This consisted of one large ossicle and 1 smaller ossicle, dimensions stated above.  Because this left a slight lateral prominence on the inferior aspect of the patella, this was also shaved down with the use of a oscillating saw to approach a more smooth curvilinear aspect of the lateral patella   Tourniquet was let down at this point in time. Total tourniquet time was 42 minutes.   Closure ensued by closing the very thickened periosteum to itself using #1 Vicryl.  Lateral medial translation was tested and patient did not have a tight lateral retinaculum now that the ossicle was removed and we did not feel that there was a need for formal lateral lengthening.  Subcutaneous tissue was closed with 2-0 Vicryl. The skin was closed with running Monocryl.  Exofin, a sterile dressing and a Tubigrip stockinette was put in place.   The patient tolerated the procedure well and was taken to the recovery room in satisfactory condition.   Patient will be maintained weightbearing as tolerated in a hinged knee brace.. She can remove the knee immobilizer for gentle range of motion and sitting position as tolerates.  She will follow the lateral retinacular lengthening protocol.  She should use this for all weightbearing activities for 2 weeks' time, after which she will follow a functional return to activities  JASEN CHURCH MD

## 2023-03-14 ENCOUNTER — OFFICE VISIT (OUTPATIENT)
Dept: ORTHOPEDICS | Facility: CLINIC | Age: 23
End: 2023-03-14
Payer: COMMERCIAL

## 2023-03-14 DIAGNOSIS — Z98.890 S/P KNEE SURGERY: Primary | ICD-10-CM

## 2023-03-14 PROCEDURE — 99024 POSTOP FOLLOW-UP VISIT: CPT | Performed by: PHYSICIAN ASSISTANT

## 2023-03-14 NOTE — NURSING NOTE
Reason For Visit:   Chief Complaint   Patient presents with     RECHECK     2 week post op DOS: 3/2/23 Right knee scope and Open excision of patella ossicle x2 (30 x 25 mm, 10 x 6 mm) wtih Dr. Hassan        [facility] 02/09/2023     Pain Assessment  Patient Currently in Pain: Yes  Patient's Stated Pain Goal: 2    Pat Gonzales

## 2023-03-14 NOTE — LETTER
3/14/2023         RE: Smitha Seaman  [address] [id number]        Dear Colleague,    Thank you for referring your patient, Smitha Seaman, to the [facility] [place]. Please see a copy of my visit note below.    ASSESSMENT/PLAN:  Smitha Seaman is a 23 year old who is status post right knee arthroscopy, open excision of patella ossicle x 2 (30 x 25mm and 10 x 6mm) with Dr. Hassan on 3/2/23.    The incision was examined and cleansed today. Monocryl suture tails trimmed. The patient was given instructions to refrain from submerging in a tub or a pool until it is a well-healed scar. They may shower and pat dry with gentle soapy lather.     She has been in direct communication with Dr. Hassan through her , Tye and there are plans for Dr. Hassan to check on her in the training room before she leaves for a new job out of [place] at the end of the month. Smitha has our clinic number and will call with any questions or concerns.    Stephanie Wasserman PA-C  Orthopaedic Surgery    _________________________________    HISTORY OF PRESENT ILLNESS:  Smitha Seaman is a 23 year old female who is approximately 2 weeks status post the above procedure.    Weight bearing status/devices: WBAT, weaned from brace  Pain level and management: pain is 2/10, currently using APAP prn, off narcoticts  Physical therapy & ROM: working with her , Tye, following LRL protocol.    The patient endorses swelling around the surgical incision but denies surrounding redness. The incision has been dry, without discharge or drainage. Smitha denies recent fevers and chills, as well as any other symptoms concerning for infection.     DVT prophylaxis: 162mg ASA x 4 weeks.   Patient denies calf pain or tenderness.      MEDICATIONS:   Current Outpatient Rx   Medication Sig Dispense Refill     acetaminophen (TYLENOL) 325 MG tablet Take 2 tablets (650 mg) by mouth every 4  hours as needed for mild pain 50 tablet 0     aspirin (ASA) 81 MG EC tablet Take 2 tablets (162 mg) by mouth daily 56 tablet 0     sertraline (ZOLOFT) 100 MG tablet Take 1 tablet (100 mg) by mouth daily (Patient taking differently: Take 100 mg by mouth every morning) 93 tablet 1         ALLERGIES: Patient has no known allergies.       PHYSICAL EXAMINATION:   On physical examination the patient is comfortable and is in no acute distress. The affect is appropriate and breathing is non-labored.    Surgical wound: The surgical incisions were exposed and found to be clean and dry, without drainage or discharge. There is mild edema around the incision. There is no erythema. The skin was appropriately warm to touch.     ROM: 0-95  Isometric activation of the quadriceps: in tact  SLR: in tact without lag  Gait: ambulating independently.     Motor intact distally throughout the tibial and peroneal nerve distributions, 5/5 strength with tibialis anterior, gastrocnemius and soleus, EHL, FHL firing  Sensation intact to light touch throughout superficial peroneal, deep peroneal, tibial, saphenous, and sural nerves  Dorsalis pedis and posterior tibial pulses palpable, toes warm and well-perfused        Again, thank you for allowing me to participate in the care of your patient.        Sincerely,        Stephanie Wasserman PA-C

## 2023-03-14 NOTE — LETTER
Date:March 22, 2023      Provider requested that no letter be sent. Do not send.       [facility]

## 2023-03-21 NOTE — PROGRESS NOTES
ASSESSMENT/PLAN:  Smitha Seaman is a 23 year old who is status post right knee arthroscopy, open excision of patella ossicle x 2 (30 x 25mm and 10 x 6mm) with Dr. Hassan on 3/2/23.    The incision was examined and cleansed today. Monocryl suture tails trimmed. The patient was given instructions to refrain from submerging in a tub or a pool until it is a well-healed scar. They may shower and pat dry with gentle soapy lather.     She has been in direct communication with Dr. Hassan through her , Tye and there are plans for Dr. Hassan to check on her in the training room before she leaves for a new job out of state at the end of the month. Smitha has our clinic number and will call with any questions or concerns.    Stephanie Wasserman PA-C  Orthopaedic Surgery    _________________________________    HISTORY OF PRESENT ILLNESS:  Smitha Seaman is a 23 year old female who is approximately 2 weeks status post the above procedure.    Weight bearing status/devices: WBAT, weaned from brace  Pain level and management: pain is 2/10, currently using APAP prn, off narcoticts  Physical therapy & ROM: working with her , Tye, following LRL protocol.    The patient endorses swelling around the surgical incision but denies surrounding redness. The incision has been dry, without discharge or drainage. Smitha denies recent fevers and chills, as well as any other symptoms concerning for infection.     DVT prophylaxis: 162mg ASA x 4 weeks.   Patient denies calf pain or tenderness.      MEDICATIONS:   Current Outpatient Rx   Medication Sig Dispense Refill     acetaminophen (TYLENOL) 325 MG tablet Take 2 tablets (650 mg) by mouth every 4 hours as needed for mild pain 50 tablet 0     aspirin (ASA) 81 MG EC tablet Take 2 tablets (162 mg) by mouth daily 56 tablet 0     sertraline (ZOLOFT) 100 MG tablet Take 1 tablet (100 mg) by mouth daily (Patient taking differently: Take 100 mg by mouth every morning) 93 tablet  1         ALLERGIES: Patient has no known allergies.       PHYSICAL EXAMINATION:   On physical examination the patient is comfortable and is in no acute distress. The affect is appropriate and breathing is non-labored.    Surgical wound: The surgical incisions were exposed and found to be clean and dry, without drainage or discharge. There is mild edema around the incision. There is no erythema. The skin was appropriately warm to touch.     ROM: 0-95  Isometric activation of the quadriceps: in tact  SLR: in tact without lag  Gait: ambulating independently.     Motor intact distally throughout the tibial and peroneal nerve distributions, 5/5 strength with tibialis anterior, gastrocnemius and soleus, EHL, FHL firing  Sensation intact to light touch throughout superficial peroneal, deep peroneal, tibial, saphenous, and sural nerves  Dorsalis pedis and posterior tibial pulses palpable, toes warm and well-perfused

## 2023-04-04 DIAGNOSIS — F41.1 GAD (GENERALIZED ANXIETY DISORDER): ICD-10-CM

## 2023-04-04 RX ORDER — SERTRALINE HYDROCHLORIDE 100 MG/1
100 TABLET, FILM COATED ORAL DAILY
Qty: 93 TABLET | Refills: 1 | Status: SHIPPED | OUTPATIENT
Start: 2023-04-04 | End: 2023-09-19

## 2023-04-05 DIAGNOSIS — N39.0 URINARY TRACT INFECTION WITHOUT HEMATURIA, SITE UNSPECIFIED: Primary | ICD-10-CM

## 2023-04-05 RX ORDER — SULFAMETHOXAZOLE/TRIMETHOPRIM 800-160 MG
1 TABLET ORAL 2 TIMES DAILY
Qty: 10 TABLET | Refills: 0 | Status: SHIPPED | OUTPATIENT
Start: 2023-04-05 | End: 2024-04-30

## 2023-07-02 ENCOUNTER — HEALTH MAINTENANCE LETTER (OUTPATIENT)
Age: 23
End: 2023-07-02

## 2023-09-19 DIAGNOSIS — F41.1 GAD (GENERALIZED ANXIETY DISORDER): ICD-10-CM

## 2023-09-19 RX ORDER — SERTRALINE HYDROCHLORIDE 100 MG/1
100 TABLET, FILM COATED ORAL DAILY
Qty: 30 TABLET | Refills: 0 | Status: SHIPPED | OUTPATIENT
Start: 2023-09-19 | End: 2024-04-30

## 2024-01-09 ENCOUNTER — TELEPHONE (OUTPATIENT)
Dept: ORTHOPEDICS | Facility: CLINIC | Age: 24
End: 2024-01-09
Payer: COMMERCIAL

## 2024-01-09 DIAGNOSIS — Q74.1 BIPARTITE PATELLA: Primary | ICD-10-CM

## 2024-01-09 NOTE — TELEPHONE ENCOUNTER
Phoned patient to confirm surgery date with Dr. Hassan on 5/2/24 and get her schedule for a RTN and PAC appt.    Call went to voicemail.   Provided call back number in voicemail:   [phone] & [phone] for care team.

## 2024-01-10 NOTE — TELEPHONE ENCOUNTER
Scheduled patient for return visit with Dr Hassan with imaging on Tuesday 4/30. Patient is unable to get to [place] before then. Message routed to nurse to schedule patient with PAC.  Edel Pichardo ATC

## 2024-01-11 NOTE — TELEPHONE ENCOUNTER
Patient is scheduled for surgery with Dr. Hassan    Spoke with: CC    Date of Surgery: 5/2/24    Location: [facility]    Informed patient they will need an adult  Yes    Pre op with Provider PAC, 4/30/24 at 11:30AM    Additional imaging/appointments: POP Made    Surgery packet: Will receive in clinic.      Additional comments: N/A        Natalia Espinal on 1/11/2024 at 9:47 AM

## 2024-01-12 NOTE — TELEPHONE ENCOUNTER
FUTURE VISIT INFORMATION      SURGERY INFORMATION:  Date: 24  Location:  or  Surgeon:  Poonam Hassan MD   Anesthesia Type:  general  Procedure: left knee arthroscopy, partial lateral facetectomy   Consult: ov /3     RECORDS REQUESTED FROM:         Primary Care Provider: Janna Caba MD- [facility]     Most recent EKG+ Tracin21     Most recent ECHO: 10/21/20

## 2024-03-06 DIAGNOSIS — Z98.890 S/P KNEE SURGERY: Primary | ICD-10-CM

## 2024-04-19 DIAGNOSIS — Z98.890 S/P KNEE SURGERY: Primary | ICD-10-CM

## 2024-04-19 DIAGNOSIS — M25.562 LEFT KNEE PAIN, UNSPECIFIED CHRONICITY: ICD-10-CM

## 2024-04-25 ENCOUNTER — TELEPHONE (OUTPATIENT)
Dept: ORTHOPEDICS | Facility: CLINIC | Age: 24
End: 2024-04-25
Payer: COMMERCIAL

## 2024-04-25 NOTE — TELEPHONE ENCOUNTER
- A call was placed to the patient. Patient did not answer phone so a voicemail was left.     - I told the patient I was calling to go over pre-op teaching on their proposed procedure with Dr. Hassan.     - Call back number to clinic was given and patient was told to call back and ask for Dr. Sonya Curiel's nurse when they were able.

## 2024-04-26 ENCOUNTER — TELEPHONE (OUTPATIENT)
Dept: ORTHOPEDICS | Facility: CLINIC | Age: 24
End: 2024-04-26
Payer: COMMERCIAL

## 2024-04-29 ENCOUNTER — ANESTHESIA EVENT (OUTPATIENT)
Dept: SURGERY | Facility: AMBULATORY SURGERY CENTER | Age: 24
End: 2024-04-29
Payer: COMMERCIAL

## 2024-04-30 ENCOUNTER — ANCILLARY PROCEDURE (OUTPATIENT)
Dept: GENERAL RADIOLOGY | Facility: CLINIC | Age: 24
End: 2024-04-30
Attending: ORTHOPAEDIC SURGERY
Payer: COMMERCIAL

## 2024-04-30 ENCOUNTER — OFFICE VISIT (OUTPATIENT)
Dept: SURGERY | Facility: CLINIC | Age: 24
End: 2024-04-30
Payer: COMMERCIAL

## 2024-04-30 ENCOUNTER — PRE VISIT (OUTPATIENT)
Dept: SURGERY | Facility: CLINIC | Age: 24
End: 2024-04-30

## 2024-04-30 ENCOUNTER — OFFICE VISIT (OUTPATIENT)
Dept: ORTHOPEDICS | Facility: CLINIC | Age: 24
End: 2024-04-30
Payer: COMMERCIAL

## 2024-04-30 VITALS
TEMPERATURE: 97.6 F | HEIGHT: 70 IN | BODY MASS INDEX: 21.11 KG/M2 | RESPIRATION RATE: 16 BRPM | SYSTOLIC BLOOD PRESSURE: 106 MMHG | DIASTOLIC BLOOD PRESSURE: 67 MMHG | HEART RATE: 52 BPM | WEIGHT: 147.49 LBS | OXYGEN SATURATION: 99 %

## 2024-04-30 VITALS — HEIGHT: 70 IN | WEIGHT: 147.5 LBS | BODY MASS INDEX: 21.11 KG/M2

## 2024-04-30 DIAGNOSIS — Z98.890 S/P KNEE SURGERY: Primary | ICD-10-CM

## 2024-04-30 DIAGNOSIS — M25.562 LEFT KNEE PAIN, UNSPECIFIED CHRONICITY: ICD-10-CM

## 2024-04-30 DIAGNOSIS — Z98.890 S/P KNEE SURGERY: ICD-10-CM

## 2024-04-30 DIAGNOSIS — Z01.818 PRE-OP EVALUATION: Primary | ICD-10-CM

## 2024-04-30 PROCEDURE — 77073 BONE LENGTH STUDIES: CPT | Performed by: STUDENT IN AN ORGANIZED HEALTH CARE EDUCATION/TRAINING PROGRAM

## 2024-04-30 PROCEDURE — 99213 OFFICE O/P EST LOW 20 MIN: CPT | Performed by: ORTHOPAEDIC SURGERY

## 2024-04-30 PROCEDURE — 99212 OFFICE O/P EST SF 10 MIN: CPT | Performed by: PHYSICIAN ASSISTANT

## 2024-04-30 PROCEDURE — 73560 X-RAY EXAM OF KNEE 1 OR 2: CPT | Mod: GC | Performed by: RADIOLOGY

## 2024-04-30 ASSESSMENT — ENCOUNTER SYMPTOMS
DYSRHYTHMIAS: 0
SEIZURES: 0

## 2024-04-30 ASSESSMENT — LIFESTYLE VARIABLES: TOBACCO_USE: 0

## 2024-04-30 ASSESSMENT — PAIN SCALES - GENERAL: PAINLEVEL: MILD PAIN (3)

## 2024-04-30 NOTE — NURSING NOTE
Pre-Op Teaching was done in person at the clinic.    Teaching Flowsheet   Relevant Diagnosis: Pre-Op Teaching  Teaching Topic:      Person(s) involved in teaching:   Patient     Motivation Level:  Asks Questions: Yes  Eager to Learn: Yes  Cooperative: Yes  Receptive (willing/able to accept information): Yes  Any cultural factors/Cheondoism beliefs that may influence understanding or compliance? No     Patient demonstrates understanding of the following:  Reason for the appointment, diagnosis and treatment plan: Yes  Knowledge of proper use of medications and conditions for which they are ordered (with special attention to potential side effects or drug interactions): Yes  Which situations necessitate calling provider and whom to contact: Yes- discussed the stoplight tool to help assist with this.      Teaching Concerns Addressed:      Proper use of surgical scrub explain: Yes    Nutritional needs and diet plan: Yes  Pain management techniques: Yes  Wound Care: Yes  How and/when to access community resources: Yes     Instructional Materials Used/Given:  2 bottle of chlorhexidine and a surgery packet given to patient in clinic.      - Important contact info/ phone numbers: emphasizing clinic number and after hours number  - Map/ location of surgery  - Medications to avoid  - Showering instructions  - Stop light tool    Additionally the following was discussed with patient:  - Mother will be driving the patient to surgery and staying with them for 24 hours.       -Next step: PAC appointment scheduled for today, surgery 5/2    Time spent with patient: 15 minutes.

## 2024-04-30 NOTE — LETTER
4/30/2024         RE: Smitha Seaman  [address]  [address] [place] [id number]        Dear Colleague,    Thank you for referring your patient, Smitha Seaman, to the [facility] [place]. Please see a copy of my visit note below.    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  She is status post a excision of an accessory patella ossicle on her right knee 3/2/2023.  She has no complaints of discomfort on her right knee, and feels that the knee is feeling the best that it has felt for many years.  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  She has done well with that knee and feels that all of her pain now is her opposite side.  She currently is an  and  for volleyball at [employer].  She is here to discuss surgery on her opposite left knee  Physical exam of patient's right knee reveals a lateral based incision which is well-healed.  Good straight leg raising effort without a lag.  No suggestion of quad atrophy.  Patella remains slightly oddly shaped but the prominence on the superior lateral pole is gone.  Range of motion 0-1 48.  No crepitus to active extension. Passive patella mobility 1+ quadrant lateral mobility.  Neutral medial patella tilt test.  Examination of patient's left knee reveals no knee swelling.  Prominent superior lateral protuberance of the patella.  Skin intact.  Good straight leg raising effort without a lag.  Full range of motion without crepitus.  Passive patella mobility 1+ quadrant lateral mobility.  Negative medial patella tilt test.  Long-leg alignment view as well as bilateral axial views were obtained.  On the right knee 1 can see the excision of the previous ossicle with a small ossicle remaining under the most superior portion of the patella approximately midline.  Left knee reveals the accessory ossicle.  The  superior medial extent of this ossicle is not as dramatic as the opposite side and we will probably remove this ossicle in total combined with a lateral retinacular lengthening.  Well she is sick and she is sounds CT scan member but she is very mature she is very tiny I will figure it out  She isThe images, current x-rays, and anticipated postoperative course were discussed with CC.  She will be home in [place] for approximately 3 weeks at which time she will be receiving physical therapy through [facility].  We have a follow-up visit in June, which will be onsite a virtual.  All questions were answered.  Poonam Hassan MD  Professor Orthopedic Surgery  [facility]

## 2024-04-30 NOTE — H&P
Pre-Operative H & P     CC:  Preoperative exam to assess for increased cardiopulmonary risk while undergoing surgery and anesthesia.    Date of Encounter: 4/30/2024  Primary Care Physician:  [employer]     Reason for visit:   Encounter Diagnosis   Name Primary?    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 Seaman is a  who has been followed by Dr. Hassan for bilateral knee pain.  She is s/p right knee arthroscopy last year.  She is now scheduled for the above procedure.    History is obtained from the patient and chart review    Hx of abnormal bleeding or anti-platelet use: ASA    Menstrual history: Patient's last menstrual period was 04/25/2024 (exact date).     Past Medical History  Past Medical History:   Diagnosis Date    Anxiety     Bipartite patella 02/09/2023       Past Surgical History  Past Surgical History:   Procedure Laterality Date    ARTHROSCOPY KNEE WITH RETINACULAR RELEASE Right 3/2/2023    Procedure: right knee arthroscopy with excision of accessory patellar ossicle x2;  Surgeon: Poonam Hassan MD;  Location: [place] OR    ORTHOPEDIC SURGERY      bilateral ankle surgeries    Jamul teeth extraction          Prior to Admission Medications  No current outpatient medications on file.       Allergies  No Known Allergies    Social History  Social History     Socioeconomic History    Marital status: Single     Spouse  name: Not on file    Number of children: Not on file    Years of education: Not on file    Highest education level: Not on file   Occupational History    Not on file   Tobacco Use    Smoking status: Never    Smokeless tobacco: Never   Substance and Sexual Activity    Alcohol use: Yes     Comment: Little bit    Drug use: Never    Sexual activity: Not on file   Other Topics Concern    Not on file   Social History Narrative    Not on file     Social Determinants of Health     Financial Resource Strain: High Risk (1/1/2022)    Received from [facility]    Financial Resource Strain     Difficulty of Paying Living Expenses: Not on file     Difficulty of Paying Living Expenses: Not on file   Food Insecurity: Not on file   Transportation Needs: Not on file   Physical Activity: Not on file   Stress: Not on file   Social Connections: Unknown (1/1/2022)    Received from [facility]    Social Connections     Frequency of Communication with Friends and Family: Not on file   Interpersonal Safety: Not on file   Housing Stability: Not on file       Family History  Family History   Problem Relation Age of Onset    Cancer Maternal Grandfather     Cancer Paternal Grandfather     Anesthesia Reaction No family hx of     Clotting Disorder No family hx of        Review of Systems  The complete review of systems is negative other than noted in the HPI or here.   Anesthesia Evaluation   Pt has had prior anesthetic. Type: General and MAC.    No history of anesthetic complications       ROS/MED HX  ENT/Pulmonary:  - neg pulmonary ROS  (-) tobacco use and recent URI   Neurologic:  - neg neurologic ROS  (-) no seizures and no CVA   Cardiovascular: Comment: cMRI 10/2020  1. The LV is normal in cavity size and wall thickness. The global systolic function is normal. The LVEF is  65%. There are no regional wall motion abnormalities.     2. The RV is normal in cavity size. The global  "systolic function is normal. The RVEF is 60%.      3. Both atria are normal in size.     4. There is no significant valvular disease.      5. There is no evidence of myocardial edema on T2 weighted imaging. There is no evidence of myocardial iron  overload.      6. Late gadolinium enhancement imaging shows no MI, fibrosis or infiltrative disease.      CONCLUSIONS: Normal bi-ventricular size and systolic function. No late enhancement to suggest prior  infarction, inflammation or infiltration.      (+)  - -   -  - -   Taking blood thinners                              Previous cardiac testing   Echo: Date: Results:    Stress Test:  Date: Results:    ECG Reviewed:  Date: 12/2021 Results:  Sinus bradycardia  Cath:  Date: Results:   (-) HERNANDEZ and arrhythmias   METS/Exercise Tolerance: >4 METS Comment: Workout 5-6 days/week, running, lifting, etc   Hematologic:  - neg hematologic  ROS  (-) history of blood clots and history of blood transfusion   Musculoskeletal: Comment: Bilateral knee pain      GI/Hepatic:    (-) GERD   Renal/Genitourinary:  - neg Renal ROS     Endo:  - neg endo ROS     Psychiatric/Substance Use:     (+) psychiatric history anxiety       Infectious Disease:  - neg infectious disease ROS     Malignancy:  - neg malignancy ROS     Other:  - neg other ROS          /67 (BP Location: Right arm, Patient Position: Sitting, Cuff Size: Adult Regular)   Pulse 52   Temp 97.6  F (36.4  C) (Oral)   Resp 16   Ht 1.768 m (5' 9.6\")   Wt 66.9 kg (147 lb 7.8 oz)   LMP 04/25/2024 (Exact Date)   SpO2 99%   Breastfeeding No   BMI 21.41 kg/m      Physical Exam   Constitutional: Awake, alert, cooperative, no apparent distress, and appears stated age.  Eyes: Pupils equal, round and reactive to light, extra ocular muscles intact, sclera clear, conjunctiva normal.  HENT: Normocephalic, oral pharynx with moist mucus membranes, good dentition. No goiter appreciated.   Respiratory: Clear to auscultation bilaterally, no " crackles or wheezing.  Cardiovascular: Regular rate and rhythm, normal S1 and S2, and no murmur noted.  Carotids no bruits. No edema. Palpable pulses to radial arteries.   GI: Normal bowel sounds, soft, non-distended, non-tender  Genitourinary:  deferred  Skin: Warm and dry.   Musculoskeletal: Full ROM of neck. There is no redness, warmth, or swelling of the exposed joints. Gross motor strength is normal.    Neurologic: Awake, alert, oriented to name, place and time. Cranial nerves II-XII are grossly intact. Gait is normal.   Neuropsychiatric: Calm, cooperative. Normal affect.     Prior Labs/Diagnostic Studies   All labs and imaging personally reviewed     EKG/ stress test - if available please see in ROS above   Echo result w/o MOPS: Interpretation SummaryGlobal and regional left ventricular function is normal with an EF of 60-65%.The right ventricle is normal size. Global right ventricular function isnormal.No significant valvular abnormalities were noted.            No data to display                  The patient's records and results personally reviewed by this provider.     Outside records reviewed from: Care Everywhere    LAB/DIAGNOSTIC STUDIES TODAY:  none    Assessment    Smitha Seaman is a 24 year old female seen as a PAC referral for risk assessment and optimization for anesthesia.    Plan/Recommendations  Pt will be optimized for the proposed procedure.  See below for details on the assessment, risk, and preoperative recommendations    NEUROLOGY  - No history of TIA, CVA or seizure  -Post Op delirium risk factors:  No risk identified    ENT  - No current airway concerns.  Will need to be reassessed day of surgery.  Mallampati: I  TM: > 3  -Permanent lower retainer    CARDIAC  - No history of CAD, Hypertension, and Afib  -Denies cardiac history or symptoms  - METS (Metabolic Equivalents)  Patient performs 4 or more METS exercise without symptoms             Total Score: 0      RCRI-Very low risk: Class  "1 0.4% complication rate             Total Score: 0        PULMONARY  NATASHA Low Risk             Total Score: 0      - Denies asthma or inhaler use  - Tobacco History    History   Smoking Status    Never   Smokeless Tobacco    Never       GI  PONV High Risk  Total Score: 3           1 AN PONV: Pt is Female    1 AN PONV: Patient is not a current smoker    1 AN PONV: Intended Post Op Opioids        /RENAL  - Baseline Creatinine WNL    ENDOCRINE    - BMI: Estimated body mass index is 21.41 kg/m  as calculated from the following:    Height as of this encounter: 1.768 m (5' 9.6\").    Weight as of this encounter: 66.9 kg (147 lb 7.8 oz).  Healthy Weight (BMI 18.5-24.9)  - No history of Diabetes Mellitus    HEME  VTE Low Risk 0.26%             Total Score: 0      - No history of abnormal bleeding or antiplatelet use.        Different anesthesia methods/types have been discussed with the patient, but they are aware that the final plan will be decided by the assigned anesthesia provider on the date of service.    The patient is optimized for their procedure. AVS with information on surgery time/arrival time, meds and NPO status given by nursing staff. No further diagnostic testing indicated.      On the day of service:     Prep time: 6 minutes  Visit time: 6 minutes  Documentation time: 3 minutes  ------------------------------------------  Total time: 15 minutes      Malissa Arellano PA-C  Preoperative Assessment Center  [facility]  Clinic and Surgery Center  Phone: [phone]  Fax: [phone]    "

## 2024-04-30 NOTE — PATIENT INSTRUCTIONS
Preparing for Your Surgery      Name:  Smitha Seaman   MRN:  [id number]   :  2000   Today's Date:  2024         Arriving for surgery:  Surgery date:  24  Arrival time:  10:30 am  Surgery time: 12:00 pm    Restrictions due to COVID 19:    Please maintain social distance.  Masking is optional        parking is available for anyone with mobility limitations or disabilities. (Monday- Friday 7 am- 5 pm)    Please come to:    [facility]  [address] [place] [phone]    Check in on the 5th floor, Ambulatory Surgery Center.    What can I eat or drink?    -  You may eat and drink normally until 8 hours before arrival time  (Until 2:30 am)  -  You may have clear liquids until 2 hours before arrival time  (Until 8:30 am)    Examples of clear liquids:  Water  Clear broth  Juices (apple, white grape, white cranberry  and cider) without pulp  Noncarbonated, powder based beverages  (lemonade and Jeremi-Aid)  Sodas (Sprite, 7-Up, ginger ale and seltzer)  Coffee or tea (without milk or cream)  Gatorade    --No alcohol or cannabis products for at least 24 hours before surgery    Which medicines can I take?    Hold Aspirin for 7 days before surgery.   Hold Multivitamins for 7 days before surgery.  Hold Supplements for 7 days before surgery.  Hold Ibuprofen (Advil, Motrin) for 1 day before surgery--unless otherwise directed by surgeon.  Hold Naproxen (Aleve) for 4 days before surgery.  You may take Tylenol if needed for pain.      How do I prepare myself?  - Please take 2 showers (one the night prior to surgery and one the morning of surgery) using Scrubcare or Hibiclens soap.    Use this soap only from the neck to your toes.     Leave the soap on your skin for one minute--then rinse thoroughly.      You may use your own shampoo and conditioner; no other hair products.   - Please remove all jewelry and body piercings.  - No lotions, deodorants or fragrance.  - No makeup  or fingernail polish.   - Bring your ID and insurance card.    -If you have a Deep Brain Stimulator, a Spinal Cord Stimulator or any implanted Neuro device you must bring the remote to the Surgery Center          ALL PATIENTS ARE REQUIRED TO HAVE A RESPONSIBLE ADULT TO DRIVE AND BE IN ATTENDANCE WITH THEM FOR 24 HOURS FOLLOWING SURGERY       Covid testing policy as of 12/06/2022  Your surgeon will notify and schedule you for a COVID test if one is needed before surgery--please direct any questions or COVID symptoms to your surgeon      Questions or Concerns:    -For questions regarding the day of surgery please contact the Ambulatory Surgery Center at [phone].    -If you have health changes between today and your surgery please contact your surgeon.     For questions after surgery please call your surgeons office.

## 2024-04-30 NOTE — NURSING NOTE
"Reason For Visit:   Chief Complaint   Patient presents with    RECHECK     Images and discuss surgery 5/2 Left knee - Talk about post op appts       Primary MD: Dr. Eric Sampson  Ref. MD: EST    ?  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    Date of injury: 2022  Type of injury: [place] sports injury.    Date of surgery: 3/2/23  Type of surgery: right knee arthroscopy with excision of accessory patellar ossicle x2     Smoker: No  Request smoking cessation information: No    Ht 1.768 m (5' 9.61\")   Wt 66.9 kg (147 lb 8 oz)   BMI 21.40 kg/m      Pain Assessment  Patient Currently in Pain: Yes  0-10 Pain Scale: 4 (right knee 1/10)  Primary Pain Location: Knee (Left knee)       Candida Tomlin LPN   "

## 2024-04-30 NOTE — PROGRESS NOTES
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  She is status post a excision of an accessory patella ossicle on her right knee 3/2/2023.  She has no complaints of discomfort on her right knee, and feels that the knee is feeling the best that it has felt for many years.  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  She has done well with that knee and feels that all of her pain now is her opposite side.  She currently is an  and  for volleyball at [employer].  She is here to discuss surgery on her opposite left knee  Physical exam of patient's right knee reveals a lateral based incision which is well-healed.  Good straight leg raising effort without a lag.  No suggestion of quad atrophy.  Patella remains slightly oddly shaped but the prominence on the superior lateral pole is gone.  Range of motion 0-1 48.  No crepitus to active extension. Passive patella mobility 1+ quadrant lateral mobility.  Neutral medial patella tilt test.  Examination of patient's left knee reveals no knee swelling.  Prominent superior lateral protuberance of the patella.  Skin intact.  Good straight leg raising effort without a lag.  Full range of motion without crepitus.  Passive patella mobility 1+ quadrant lateral mobility.  Negative medial patella tilt test.  Long-leg alignment view as well as bilateral axial views were obtained.  On the right knee 1 can see the excision of the previous ossicle with a small ossicle remaining under the most superior portion of the patella approximately midline.  Left knee reveals the accessory ossicle.  The superior medial extent of this ossicle is not as dramatic as the opposite side and we will probably remove this ossicle in total combined with a lateral retinacular lengthening.  Well she is sick and she is sounds CT scan member but she is very mature she is very tiny I will  figure it out  She isThe images, current x-rays, and anticipated postoperative course were discussed with CC.  She will be home in [place] for approximately 3 weeks at which time she will be receiving physical therapy through [facility].  We have a follow-up visit in June, which will be onsite a virtual.  All questions were answered.  Poonam Hassan MD  Professor Orthopedic Surgery  [facility]

## 2024-05-01 ASSESSMENT — LIFESTYLE VARIABLES: TOBACCO_USE: 0

## 2024-05-01 ASSESSMENT — ENCOUNTER SYMPTOMS
DYSRHYTHMIAS: 0
SEIZURES: 0

## 2024-05-02 ENCOUNTER — HOSPITAL ENCOUNTER (OUTPATIENT)
Facility: AMBULATORY SURGERY CENTER | Age: 24
Discharge: HOME | End: 2024-05-02
Attending: ORTHOPAEDIC SURGERY
Payer: COMMERCIAL

## 2024-05-02 ENCOUNTER — ANESTHESIA (OUTPATIENT)
Dept: SURGERY | Facility: AMBULATORY SURGERY CENTER | Age: 24
End: 2024-05-02
Payer: COMMERCIAL

## 2024-05-02 VITALS
HEART RATE: 60 BPM | WEIGHT: 147.71 LBS | TEMPERATURE: 97 F | HEIGHT: 70 IN | OXYGEN SATURATION: 100 % | BODY MASS INDEX: 21.15 KG/M2 | DIASTOLIC BLOOD PRESSURE: 46 MMHG | SYSTOLIC BLOOD PRESSURE: 106 MMHG | RESPIRATION RATE: 18 BRPM

## 2024-05-02 DIAGNOSIS — Z98.890 S/P KNEE SURGERY: Primary | ICD-10-CM

## 2024-05-02 PROCEDURE — 27350 REMOVAL OF KNEECAP: CPT | Mod: LT

## 2024-05-02 PROCEDURE — 27350 REMOVAL OF KNEECAP: CPT | Performed by: NURSE ANESTHETIST, CERTIFIED REGISTERED

## 2024-05-02 PROCEDURE — 81025 URINE PREGNANCY TEST: CPT | Performed by: PATHOLOGY

## 2024-05-02 PROCEDURE — 27350 REMOVAL OF KNEECAP: CPT | Performed by: ANESTHESIOLOGY

## 2024-05-02 RX ORDER — PROPOFOL 10 MG/ML
INJECTION, EMULSION INTRAVENOUS PRN
Status: DISCONTINUED | OUTPATIENT
Start: 2024-05-02 | End: 2024-05-02

## 2024-05-02 RX ORDER — MINERAL OIL
OIL (ML) MISCELLANEOUS DAILY PRN
Status: DISCONTINUED | OUTPATIENT
Start: 2024-05-02 | End: 2024-05-02 | Stop reason: HOSPADM

## 2024-05-02 RX ORDER — PROPOFOL 10 MG/ML
INJECTION, EMULSION INTRAVENOUS CONTINUOUS PRN
Status: DISCONTINUED | OUTPATIENT
Start: 2024-05-02 | End: 2024-05-02

## 2024-05-02 RX ORDER — ACETAMINOPHEN 325 MG/1
975 TABLET ORAL ONCE
Status: DISCONTINUED | OUTPATIENT
Start: 2024-05-02 | End: 2024-05-02 | Stop reason: HOSPADM

## 2024-05-02 RX ORDER — DEXAMETHASONE SODIUM PHOSPHATE 4 MG/ML
INJECTION, SOLUTION INTRA-ARTICULAR; INTRALESIONAL; INTRAMUSCULAR; INTRAVENOUS; SOFT TISSUE PRN
Status: DISCONTINUED | OUTPATIENT
Start: 2024-05-02 | End: 2024-05-02

## 2024-05-02 RX ORDER — FENTANYL CITRATE 50 UG/ML
25 INJECTION, SOLUTION INTRAMUSCULAR; INTRAVENOUS EVERY 5 MIN PRN
Status: DISCONTINUED | OUTPATIENT
Start: 2024-05-02 | End: 2024-05-02 | Stop reason: HOSPADM

## 2024-05-02 RX ORDER — CEFAZOLIN SODIUM 2 G/50ML
2 SOLUTION INTRAVENOUS SEE ADMIN INSTRUCTIONS
Status: DISCONTINUED | OUTPATIENT
Start: 2024-05-02 | End: 2024-05-02 | Stop reason: HOSPADM

## 2024-05-02 RX ORDER — ONDANSETRON 4 MG/1
4 TABLET, ORALLY DISINTEGRATING ORAL EVERY 30 MIN PRN
Status: DISCONTINUED | OUTPATIENT
Start: 2024-05-02 | End: 2024-05-02 | Stop reason: HOSPADM

## 2024-05-02 RX ORDER — SODIUM CHLORIDE, SODIUM LACTATE, POTASSIUM CHLORIDE, CALCIUM CHLORIDE 600; 310; 30; 20 MG/100ML; MG/100ML; MG/100ML; MG/100ML
INJECTION, SOLUTION INTRAVENOUS CONTINUOUS
Status: DISCONTINUED | OUTPATIENT
Start: 2024-05-02 | End: 2024-05-02 | Stop reason: HOSPADM

## 2024-05-02 RX ORDER — ACETAMINOPHEN 325 MG/1
650 TABLET ORAL
Status: DISCONTINUED | OUTPATIENT
Start: 2024-05-02 | End: 2024-05-03 | Stop reason: HOSPADM

## 2024-05-02 RX ORDER — NALOXONE HYDROCHLORIDE 0.4 MG/ML
0.1 INJECTION, SOLUTION INTRAMUSCULAR; INTRAVENOUS; SUBCUTANEOUS
Status: DISCONTINUED | OUTPATIENT
Start: 2024-05-02 | End: 2024-05-02 | Stop reason: HOSPADM

## 2024-05-02 RX ORDER — OXYCODONE HYDROCHLORIDE 5 MG/1
5 TABLET ORAL
Status: DISCONTINUED | OUTPATIENT
Start: 2024-05-02 | End: 2024-05-03 | Stop reason: HOSPADM

## 2024-05-02 RX ORDER — OXYCODONE HYDROCHLORIDE 5 MG/1
10 TABLET ORAL
Status: DISCONTINUED | OUTPATIENT
Start: 2024-05-02 | End: 2024-05-03 | Stop reason: HOSPADM

## 2024-05-02 RX ORDER — FENTANYL CITRATE 50 UG/ML
INJECTION, SOLUTION INTRAMUSCULAR; INTRAVENOUS PRN
Status: DISCONTINUED | OUTPATIENT
Start: 2024-05-02 | End: 2024-05-02

## 2024-05-02 RX ORDER — BUPIVACAINE HYDROCHLORIDE AND EPINEPHRINE 2.5; 5 MG/ML; UG/ML
INJECTION, SOLUTION INFILTRATION; PERINEURAL DAILY PRN
Status: DISCONTINUED | OUTPATIENT
Start: 2024-05-02 | End: 2024-05-02 | Stop reason: HOSPADM

## 2024-05-02 RX ORDER — CEFAZOLIN SODIUM 2 G/50ML
2 SOLUTION INTRAVENOUS
Status: COMPLETED | OUTPATIENT
Start: 2024-05-02 | End: 2024-05-02

## 2024-05-02 RX ORDER — ONDANSETRON 4 MG/1
4 TABLET, ORALLY DISINTEGRATING ORAL
Status: DISCONTINUED | OUTPATIENT
Start: 2024-05-02 | End: 2024-05-03 | Stop reason: HOSPADM

## 2024-05-02 RX ORDER — LIDOCAINE HYDROCHLORIDE 20 MG/ML
INJECTION, SOLUTION INFILTRATION; PERINEURAL PRN
Status: DISCONTINUED | OUTPATIENT
Start: 2024-05-02 | End: 2024-05-02

## 2024-05-02 RX ORDER — ACETAMINOPHEN 325 MG/1
975 TABLET ORAL ONCE
Status: COMPLETED | OUTPATIENT
Start: 2024-05-02 | End: 2024-05-02

## 2024-05-02 RX ORDER — LIDOCAINE 40 MG/G
CREAM TOPICAL
Status: DISCONTINUED | OUTPATIENT
Start: 2024-05-02 | End: 2024-05-02 | Stop reason: HOSPADM

## 2024-05-02 RX ORDER — ONDANSETRON 2 MG/ML
4 INJECTION INTRAMUSCULAR; INTRAVENOUS EVERY 30 MIN PRN
Status: DISCONTINUED | OUTPATIENT
Start: 2024-05-02 | End: 2024-05-03 | Stop reason: HOSPADM

## 2024-05-02 RX ORDER — ONDANSETRON 4 MG/1
4 TABLET, ORALLY DISINTEGRATING ORAL EVERY 8 HOURS PRN
Qty: 4 TABLET | Refills: 0 | Status: SHIPPED | OUTPATIENT
Start: 2024-05-02

## 2024-05-02 RX ORDER — HYDROCODONE BITARTRATE AND ACETAMINOPHEN 5; 325 MG/1; MG/1
1 TABLET ORAL EVERY 4 HOURS PRN
Qty: 6 TABLET | Refills: 0 | Status: SHIPPED | OUTPATIENT
Start: 2024-05-02

## 2024-05-02 RX ORDER — ASPIRIN 81 MG/1
162 TABLET ORAL DAILY
Qty: 28 TABLET | Refills: 0 | Status: SHIPPED | OUTPATIENT
Start: 2024-05-02 | End: 2024-05-16

## 2024-05-02 RX ORDER — FENTANYL CITRATE 50 UG/ML
50 INJECTION, SOLUTION INTRAMUSCULAR; INTRAVENOUS EVERY 5 MIN PRN
Status: DISCONTINUED | OUTPATIENT
Start: 2024-05-02 | End: 2024-05-02 | Stop reason: HOSPADM

## 2024-05-02 RX ORDER — KETOROLAC TROMETHAMINE 30 MG/ML
INJECTION, SOLUTION INTRAMUSCULAR; INTRAVENOUS PRN
Status: DISCONTINUED | OUTPATIENT
Start: 2024-05-02 | End: 2024-05-02

## 2024-05-02 RX ORDER — HYDROMORPHONE HYDROCHLORIDE 1 MG/ML
0.2 INJECTION, SOLUTION INTRAMUSCULAR; INTRAVENOUS; SUBCUTANEOUS EVERY 5 MIN PRN
Status: DISCONTINUED | OUTPATIENT
Start: 2024-05-02 | End: 2024-05-02 | Stop reason: HOSPADM

## 2024-05-02 RX ORDER — ONDANSETRON 4 MG/1
4 TABLET, ORALLY DISINTEGRATING ORAL EVERY 30 MIN PRN
Status: DISCONTINUED | OUTPATIENT
Start: 2024-05-02 | End: 2024-05-03 | Stop reason: HOSPADM

## 2024-05-02 RX ORDER — ONDANSETRON 2 MG/ML
4 INJECTION INTRAMUSCULAR; INTRAVENOUS EVERY 30 MIN PRN
Status: DISCONTINUED | OUTPATIENT
Start: 2024-05-02 | End: 2024-05-02 | Stop reason: HOSPADM

## 2024-05-02 RX ORDER — HYDROMORPHONE HYDROCHLORIDE 1 MG/ML
0.4 INJECTION, SOLUTION INTRAMUSCULAR; INTRAVENOUS; SUBCUTANEOUS EVERY 5 MIN PRN
Status: DISCONTINUED | OUTPATIENT
Start: 2024-05-02 | End: 2024-05-02 | Stop reason: HOSPADM

## 2024-05-02 RX ORDER — HYDROCODONE BITARTRATE AND ACETAMINOPHEN 5; 325 MG/1; MG/1
1 TABLET ORAL
Status: DISCONTINUED | OUTPATIENT
Start: 2024-05-02 | End: 2024-05-03 | Stop reason: HOSPADM

## 2024-05-02 RX ORDER — ONDANSETRON 2 MG/ML
INJECTION INTRAMUSCULAR; INTRAVENOUS PRN
Status: DISCONTINUED | OUTPATIENT
Start: 2024-05-02 | End: 2024-05-02

## 2024-05-02 RX ORDER — NALOXONE HYDROCHLORIDE 0.4 MG/ML
0.1 INJECTION, SOLUTION INTRAMUSCULAR; INTRAVENOUS; SUBCUTANEOUS
Status: DISCONTINUED | OUTPATIENT
Start: 2024-05-02 | End: 2024-05-03 | Stop reason: HOSPADM

## 2024-05-02 RX ADMIN — PROPOFOL 150 MCG/KG/MIN: 10 INJECTION, EMULSION INTRAVENOUS at 12:17

## 2024-05-02 RX ADMIN — PROPOFOL 200 MG: 10 INJECTION, EMULSION INTRAVENOUS at 12:17

## 2024-05-02 RX ADMIN — ONDANSETRON 4 MG: 2 INJECTION INTRAMUSCULAR; INTRAVENOUS at 12:42

## 2024-05-02 RX ADMIN — ACETAMINOPHEN 975 MG: 325 TABLET ORAL at 11:15

## 2024-05-02 RX ADMIN — DEXAMETHASONE SODIUM PHOSPHATE 4 MG: 4 INJECTION, SOLUTION INTRA-ARTICULAR; INTRALESIONAL; INTRAMUSCULAR; INTRAVENOUS; SOFT TISSUE at 12:42

## 2024-05-02 RX ADMIN — KETOROLAC TROMETHAMINE 30 MG: 30 INJECTION, SOLUTION INTRAMUSCULAR; INTRAVENOUS at 13:34

## 2024-05-02 RX ADMIN — FENTANYL CITRATE 50 MCG: 50 INJECTION, SOLUTION INTRAMUSCULAR; INTRAVENOUS at 12:47

## 2024-05-02 RX ADMIN — CEFAZOLIN SODIUM 2 G: 2 SOLUTION INTRAVENOUS at 12:10

## 2024-05-02 RX ADMIN — FENTANYL CITRATE 50 MCG: 50 INJECTION, SOLUTION INTRAMUSCULAR; INTRAVENOUS at 12:16

## 2024-05-02 RX ADMIN — SODIUM CHLORIDE, SODIUM LACTATE, POTASSIUM CHLORIDE, CALCIUM CHLORIDE: 600; 310; 30; 20 INJECTION, SOLUTION INTRAVENOUS at 11:17

## 2024-05-02 RX ADMIN — LIDOCAINE HYDROCHLORIDE 100 MG: 20 INJECTION, SOLUTION INFILTRATION; PERINEURAL at 12:17

## 2024-05-02 NOTE — ANESTHESIA PROCEDURE NOTES
Airway       Patient location during procedure: OR  Staff -        CRNA: Veena Calvo APRN CRNA       Performed By: CRNAIndications and Patient Condition       Indications for airway management: sánchez-procedural       Induction type:intravenous       Mask difficulty assessment: 1 - vent by mask    Final Airway Details       Final airway type: supraglottic airway    Supraglottic Airway Details        Type: LMA       Brand: LMA Unique       LMA size: 5    Post intubation assessment        Placement verified by: capnometry and chest rise        Number of attempts at approach: 2       Number of other approaches attempted: 0       Secured with: tape       Ease of procedure: easy       Dentition: Intact and Unchanged    Additional Comments       #4 LMA inserted initially. Leaking significantly at 10cm H2O. Changed to #5 LMA,

## 2024-05-02 NOTE — ANESTHESIA PREPROCEDURE EVALUATION
Anesthesia Pre-Procedure Evaluation    Patient: Smitha Seaman   MRN: [id number] : 2000        Procedure : Procedure(s):  left knee arthroscopy, partial lateral facetectomy          Past Medical History:   Diagnosis Date     Anxiety      Bipartite patella 2023      Past Surgical History:   Procedure Laterality Date     ARTHROSCOPY KNEE WITH RETINACULAR RELEASE Right 3/2/2023    Procedure: right knee arthroscopy with excision of accessory patellar ossicle x2;  Surgeon: Poonam Hassan MD;  Location: [facility] OR     ORTHOPEDIC SURGERY      bilateral ankle surgeries     [place] teeth extraction         No Known Allergies   Social History     Tobacco Use     Smoking status: Never     Smokeless tobacco: Never   Substance Use Topics     Alcohol use: Yes     Comment: Little bit      Wt Readings from Last 1 Encounters:   24 66.9 kg (147 lb 7.8 oz)        Anesthesia Evaluation   Pt has had prior anesthetic. Type: General and MAC.    No history of anesthetic complications       ROS/MED HX  ENT/Pulmonary:  - neg pulmonary ROS  (-) tobacco use and recent URI   Neurologic:  - neg neurologic ROS  (-) no seizures and no CVA   Cardiovascular: Comment: cMRI 10/2020  1. The LV is normal in cavity size and wall thickness. The global systolic function is normal. The LVEF is  65%. There are no regional wall motion abnormalities.     2. The RV is normal in cavity size. The global systolic function is normal. The RVEF is 60%.      3. Both atria are normal in size.     4. There is no significant valvular disease.      5. There is no evidence of myocardial edema on T2 weighted imaging. There is no evidence of myocardial iron  overload.      6. Late gadolinium enhancement imaging shows no MI, fibrosis or infiltrative disease.      CONCLUSIONS: Normal bi-ventricular size and systolic function. No late enhancement to suggest prior  infarction, inflammation or infiltration.      (+)  - -   -  - -   Taking blood thinners                               Previous cardiac testing   Echo: Date: Results:    Stress Test:  Date: Results:    ECG Reviewed:  Date: 12/2021 Results:  Sinus bradycardia  Cath:  Date: Results:   (-) HERNANDEZ and arrhythmias   METS/Exercise Tolerance: >4 METS Comment: Workout 5-6 days/week, running, lifting, etc   Hematologic:  - neg hematologic  ROS  (-) history of blood clots and history of blood transfusion   Musculoskeletal: Comment: Bilateral knee pain      GI/Hepatic:    (-) GERD   Renal/Genitourinary:  - neg Renal ROS     Endo:  - neg endo ROS     Psychiatric/Substance Use:     (+) psychiatric history anxiety       Infectious Disease:  - neg infectious disease ROS     Malignancy:  - neg malignancy ROS     Other:  - neg other ROS          Physical Exam    Airway        Mallampati: II   TM distance: > 3 FB   Neck ROM: full   Mouth opening: > 3 cm    Respiratory Devices and Support         Dental       (+) Completely normal teeth      Cardiovascular          Rhythm and rate: regular     Pulmonary           breath sounds clear to auscultation       OUTSIDE LABS:  CBC:   Lab Results   Component Value Date    WBC 5.9 03/02/2022    WBC 6.3 05/29/2020    HGB 13.1 03/02/2022    HGB 13.2 05/29/2020    HCT 40.0 03/02/2022    HCT 41.1 05/29/2020     03/02/2022     05/29/2020     BMP:   Lab Results   Component Value Date     03/02/2022     04/24/2019    POTASSIUM 3.9 03/02/2022    POTASSIUM 3.9 04/24/2019    CHLORIDE 108 03/02/2022    CHLORIDE 106 04/24/2019    CO2 30 03/02/2022    CO2 25 04/24/2019    BUN 14 03/02/2022    BUN 15 04/24/2019    CR 0.78 03/02/2022    CR 0.77 04/24/2019    GLC 90 03/02/2022     (H) 04/24/2019     COAGS:   Lab Results   Component Value Date    PTT 33 04/24/2019    INR 1.05 04/24/2019     POC:   Lab Results   Component Value Date    HCG Negative 03/02/2023     HEPATIC:   Lab Results   Component Value Date    ALBUMIN 3.8 03/02/2022    PROTTOTAL 6.9 03/02/2022    ALT 17  "03/02/2022    AST 14 03/02/2022    ALKPHOS 68 03/02/2022    BILITOTAL 0.2 03/02/2022     OTHER:   Lab Results   Component Value Date    ASHLEIGH 8.8 03/02/2022       Anesthesia Plan    ASA Status:  1    NPO Status:  NPO Appropriate    Anesthesia Type: General.     - Airway: LMA   Induction: Intravenous.   Maintenance: Balanced.        Consents    Anesthesia Plan(s) and associated risks, benefits, and realistic alternatives discussed. Questions answered and patient/representative(s) expressed understanding.     - Discussed:     - Discussed with:  Patient      - Extended Intubation/Ventilatory Support Discussed: No.      - Patient is DNR/DNI Status: No     Use of blood products discussed: No .     Postoperative Care       PONV prophylaxis: Ondansetron (or other 5HT-3), Dexamethasone or Solumedrol, Background Propofol Infusion     Comments:    Other Comments: We did discuss that a nerve block for pain after surgery was not ordered by the surgeon today, however I did inform her that we can do a \"rescue\" adductor canal block in recovery if pain medication is not adequate.           Sybil Gauthier MD    I have reviewed the pertinent notes and labs in the chart from the past 30 days and (re)examined the patient.  Any updates or changes from those notes are reflected in this note.                  "

## 2024-05-02 NOTE — DISCHARGE INSTRUCTIONS
[facility]  Home Care Following Anesthesia  For 24 hours after surgery:  Get plenty of rest.  A responsible adult must stay with you for at least 24 hours after you leave the surgery center.  Do not drive or use heavy equipment.  If you have weakness or tingling, don't drive or use heavy equipment until this feeling goes away.   Do not drink alcohol.   Avoid strenuous or risky activities.  Ask for help when climbing stairs.  You may feel lightheaded.  IF so, sit for a few minutes before standing.  Have someone help you get up.   If you have nausea (feel sick to your stomach): Drink only clear liquids such as apple juice, ginger ale, broth or 7-Up.  Rest may also help.  Be sure to drink enough fluids.  Move to a regular diet as you feel able.   You may have a slight fever.  Call the doctor if your fever is over 100 F (37.7 C) (taken under the tongue) or lasts longer than 24 hours.  You may have a dry mouth, a sore throat, muscle aches or trouble sleeping. These should go away after 24 hours.  Do not make important or legal decisions.   It is recommended to avoid smoking.               Tips for taking pain medications  To get the best pain relief possible, remember these points:  Take pain medications as directed, before pain becomes severe.  Pain medication can upset your stomach: taking it with food may help.  Constipation is a common side effect of pain medication. Drink plenty of  fluids.  Eat foods high in fiber. Take a stool softener if recommended by your doctor or pharmacist.  Do not drink alcohol, drive or operate machinery while taking pain medications.  Ask about other ways to control pain, such as with heat, ice or relaxation.    Tylenol/Acetaminophen Consumption    If you feel your pain relief is insufficient, you may take Tylenol/Acetaminophen in addition to your narcotic pain medication.   Be careful not to exceed 4,000 mg of Tylenol/Acetaminophen in a 24 hour  period from all sources.  If you are taking extra strength Tylenol/acetaminophen (500 mg), the maximum dose is 8 tablets in 24 hours.  If you are taking regular strength acetaminophen (325 mg), the maximum dose is 12 tablets in 24 hours.    Call a doctor for any of the following:  Signs of infection (fever, growing tenderness at the surgery site, a large amount of drainage or bleeding, severe pain, foul-smelling drainage, redness, swelling).  It has been over 8 to 10 hours since surgery and you are still not able to urinate (pass water).  Headache for over 24 hours.  Numbness, tingling or weakness the day after surgery (if you had spinal anesthesia).  Signs of Covid-19 infection (temperature over 100 degrees, shortness of breath, cough, loss of taste/smell, generalized body aches, persistent headache, chills, sore throat, nausea/vomiting/diarrhea)  Your doctor is:  Dr. Poonam Hassan, Orthopaedics: [phone]                    Or dial [phone] and ask for the resident on call for:  Orthopaedics  For emergency care, call the:  [facility] Department: [phone] (TTY for hearing impaired: [phone])                LATERAL RETINACULAR LENGTHENING POST OPERATIVE DISCHARGE INSTRUCTIONS    FOLLOW UP APPOINTMENT  You are scheduled for a post operative wound check with Dr. Hassan's clinic approximately two weeks after surgery. At approximately eight weeks after surgery, you will see Dr. Hassan in clinic.     Your follow up appointments will be at the location that you regularly see Dr. Hassan:    [facility]  [address] [place] [id number] [phone]    Physical therapy:   A referral for physical therapy will be made at discharge. You will also receive a physical therapy protocol in your after visit summary. This document must be taken to your first therapy visit.      ACTIVITY  Weight bearing status:   You may bear weight on your  operative extremity as tolerated using assistive devices (crutches) as needed. The hinged knee brace should be on and locked at 10 degrees. Duration of crutch use is typically one to two weeks for indoor movement and two to four weeks for outdoor activity. Eventually, you should wean from all assistive devices. Although we would like you to discontinue use of assistive devices as soon as possible, do not transition until you have worked with your physical therapist to achieve safe balance and comfort.     Hinged knee brace:  The brace is to be worn for up to three to six weeks following surgery. It will be locked at 10 degrees initially, but gradually unlocked as quadriceps muscle control returns and pain is controlled. When directed by your doctor or physical therapist, you may unlock the brace while sitting but lock the brace before standing. Sleep with the brace on until directed.    Exercises:   Perform the following exercises at least three times per day for the first four weeks after surgery to prevent complications, such as blood clots in your legs:  1) Point and flex your feet  2) Move your ankle around in big circles  3) Wiggle your toes   Also, perform thigh muscle tightening exercises for 10 to 15 minutes at least three times per day for the first four weeks after surgery.    Athletic Activities:  Activities such as swimming, bicycling, jogging, running, and stop-and-go sports should be avoided until permitted by your provider.    Driving:  Driving is not permitted until directed by your provider. Typically, driving is restricted for three to four weeks after right knee surgery and three weeks after left knee surgery. Under no circumstance are you permitted to drive while using narcotic pain medications.    Return to Work:  You may return to work when directed by your provider. Typically, patients with desk/sitting jobs can return to work within two weeks while patients with heavy labor jobs can return to  work around three months after surgery.      COMFORT AND PAIN MANAGEMENT  Elevation:   During times of inactivity throughout the first two weeks after surgery, make an effort to decrease swelling by elevating your operative extremity. This is most effectively done by lying down and placing several pillows lengthwise under your thigh and calf to raise your toes above the level of your nose. To ensure that your knee remains in full extension, do not place pillows directly under your knee.     Icing:  An ice pack will be provided to control swelling and discomfort after surgery. Place a thin towel on your skin and apply the ice pack overtop. You may apply ice for 20 minutes as often as two times per hour.    Pain Medications:  You will be discharged with acetaminophen (Tylenol) and a narcotic medication for pain management after surgery. Acetaminophen is most effective when it is taken per the schedule outlined by your provider (every four, six, or eight hours as prescribed). You may safely use acetaminophen as prescribed for the first four weeks after surgery provided you do not exceed the maximum daily dose prescribed by your provider (usually 3000 mg - 4000 mg). The narcotic pain medication should only be taken on an as-needed basis when necessary and should be reserved for severe pain that is not controlled with scheduled acetaminophen. In the first three days following surgery, your symptoms may warrant use of the narcotic pain medication every three, four, or six hours as prescribed. After three days, focus your efforts on decreasing (tapering) use of narcotic medications.   The most successful tapering strategy is to first, decrease the dose (number of tablets) and second, increase the interval (time in between doses). For example, if you begin taking two tablets every four hours after surgery, start your taper by decreasing one of these doses to one tablet. Every one to two days, decrease another dose to one  tablet until you are eventually taking one tablet every four hours. Once this is achieved, focus on increasing the number of hours between doses, moving from one tablet every four hours to one tablet every six hours. As tolerated, continue to increase the interval to eight and twelve hours. Eventually, taper to one dose every evening and discontinue when no longer needed.      ANTICOAGULATION  Depending on your risk factors, your provider may prescribe aspirin to prevent blood clots. If prescribed, take aspirin daily for the first four weeks after surgery.      WOUND CARE AND SHOWERING  Wound care:  Remove compression dressing (tubigrip sleeve) twice daily for 10 minutes (to prevent skin breakdown).   Remove the compressive device for showering (see showering instructions below).   Discontinue Compression dressing two days after surgery-may be worn as needed for swelling control thereafter  After ten days, remove the dressing and leave the incision open to air, covering only for showering (see showering instructions below).   Your surgical incision was closed with Exofin (a surgical adhesive that is directly on the incision areas). The Exofin should be left on until it falls off or is removed at your first office visit.   DO NOT pick or scratch your incision.   Please contact Dr. Hassan's office if you notice the following:   Significant cloudy, bloody, or malodorous drainage from the incision  Excessive warmth and redness around the incision.    Showering:  You may shower beginning two days after surgery, however, the surgical incision must remain dry until your first office visit.   Always remove the ACE wrap or tubigrip compression sleeve for showering.   During the first ten days after surgery, your surgical dressing will prevent the incision from becoming wet.   Once the surgical dressing is removed, cover the incision with saran wrap (or any other non-permeable covering) to keep the incision dry while  showering.  You are strictly prohibited from soaking or submerging the surgical wound underwater.     Tub Bathing:  Tub bathing, swimming, or any other activities that cause your incision to be submerged should be avoided until allowed by your provider. Typically, patients are allowed to return to these activities six weeks after surgery.      CONTACTING YOUR PHYSICIAN:  You may experience symptoms that require follow-up before your scheduled two week appointment. Please contact Dr. Hassan's office if you experience:  1) Pain in your knee that persists or worsens in the first few days after surgery  2) Excessive redness or drainage of cloudy or bloody material from the wounds (clear red tinted fluid and some mild drainage should be expected) or drainage of any kind five days after surgery  3) A temperature elevation greater than 101.5 F   4) Pain, swelling or redness in your calf  5) Numbness or weakness in your leg or foot      Regular business hours (Monday - Friday, 8am - 5pm):  [facility]: [phone]    After hours and weekends:  [facility] on call Orthopedic resident: [phone]              Physical Therapy Post-Operative Guidelines  Dr. Poonam Hassan  Lateral Retinacular Lengthening    Phase I: 0-2 Weeks   Precautions: No patellar mobilizations; No OKC quad through arc of motion; No motion for first two days secondary to extensive vascularity of lateral retinaculum   Weight Bearing Brace ROM    WBAT with brace  Immobilize x 48 hours to prevent bleed    Wear when up locked at 10? KF   Use crutches only for comfort and symptom control  NO KF x 48 hours   0-60? KF progressively; AAROM, emphasize full extension   Do NOT force into painful end range KF - pain level 3/10 or less   Therapeutic Exercises and Activities    Edema control   Establish high quality quad set         *Promote superior translation of patella with contraction          *Avoid co-contraction with hamstrings or proximal hip musculature         *Utilize NMES as needed   SLR x 4         *Flexion: begin in standing à reclined standing à supine                -Progress per quad control, no extensor lag         *Abduction, Adduction, Extension   Beginner mat exercises for abdominal/lumbopelvic control and proximal hip strength   Calf raises   Goals: Quad set WNL; SLR without lag, ROM to 90?; Joint effusion resolving     Phase II: 2-6 Weeks   Precautions: Observe and correct for knee/hip alignment (functional valgus at knee and pelvic drop) with squatting and SLS activities; NO OKC strengthening through arc of motion x 6 weeks; Avoid end range quadriceps stretching until 8+ weeks post op; Do not encourage reciprocal stair climbing until adequate quad strength present   Weight Bearing Brace ROM    FWB with/without brace per quad control  Gradually open for gait per quad control; discontinue when quad strength is sufficient   Transition to knee sleeve for edema management once quadriceps strength sufficient  0-120? KF progressively   Full extension   Stationary bike   Therapeutic Exercises and Activities    Double legged partial squats to 45? KF max with support or light leg press with double limb   Marching with balance moment   Initiate L/E proprio/balance drills: single limb per control/tolerance   Initiate bridging and planks   Increase repetitions w/proximal hip strength and abdominals   Goals: Effusion resolved; Preserve full extension; Flexion ROM ?120?; Normal gait pattern with progressive speed and distance as tolerated; Normal LE kinematics w/2 legged CKC activities; Multi-planar hip strength = MMT grade 5/5; Normal stair climbing       Phase III: 6-12 Weeks   Precautions: Observe and correct for knee/hip alignment in single leg squatting and functional movement patterns   Weight Bearing Brace ROM    FWB with normal gait pattern  Protective use when out of home: environmental  hazards, crowds  Full, symmetrical ROM   Therapeutic Exercise and Activities    Initiate low impact cardio (15-20 min, minimal intensity - bike, walk, elliptical)         *Initiate cardio only if ROM, quad control, and symptoms are progressing well   Progress CKC drills (step, lunge, leg press)         *Deeper angles KF (?45?) w/2 limb support as tolerated         *Early KF angles (0-45?) w/1 limb per control/tolerance   Increase workload with 2 legged CKC drills         *Resistance with 2 legged squatting         *Progress depth with single limb (step, lunge, leg press)         *Initiate large muscle group weight training (HS curls, calf raises, deadlift, etc.)   Progress L/E proprio/balance drills: surface challenge, directional reaching, stepping   Goals: Able to perform 2 legged squat ?60? KF x 20 reps w/kinematic and symptom control; Restore normal mechanics with single leg CKC L/E activities; Able to maintain single leg balance ?60 seconds; Restore normal stair climbing       Phase IV: 12-16+ weeks*   Precautions: Observe and correct for soft, low squat landing with plyos maintaining good alignment at pelvis and knee; caution regarding patellar tendinitis with progression of routine   Weight Bearing Brace ROM    FWB, no restrictions  Discontinue  Full, symmetrical ROM   Therapeutic Exercise and Activities    Progress resistance with 2 legged/1 legged strengthening drills   Introduce directional shuffling and agility footwork   Progress low impact cardio per symptoms; increase one variable at a time (intensity level, intervals, duration) - 15-20 min min minimal intensity, steady pace   Initiate return to run program if criteria met (see addendum)         *Observe for any increase in effusion or knee pain >24 hours post workout - reduce            running program intensity or frequency in response   Goals: Quad girth and strength returning; Good tolerance for normal walking speed and distance; Able to perform 2  legged squat to 90? KF x 20 reps & 1 legged squat ?45? KF x 20 reps with kinematic and symptom control     *Timeframes in later phases of rehabilitation are estimates only.  Patients may be progressed faster/slower based on their ability to attain goals for each phase. This is a functional return to activity.    Please reference Return to Sport or High Physical Demand Occupation Protocol for return to further advanced activities

## 2024-05-02 NOTE — OP NOTE
PREOPERATIVE DIAGNOSES:   1.Left  knee accessory patella ossicles, superior lateral     POSTOPERATIVE DIAGNOSES:   1. Left  knee patella ossicle  2. Cartilage surfaces satisfactory patellofemoral joint  3. Pristine tibiofemoral joint.      OPERATIONS:   1. Left knee exam under anesthesia.   2. Diagnostic arthroscopy.   3. Open excision of patellar ossicle x2 (30 x 25 mm, 10 x 6 mm)  4. Lateral Retinacular lengthening     : Poonam Hassan MD; Stephanie Wasserman PA-C  ANESTHESIA: general Anesthesia supplemented with 30 ml bupivacaine (25% w/ epi) laterally.     Exam under anesthesia of right knee revealed range of motion 4/0/147, left knee range of motion 3/0/146  Stable knee exam, stable knee Exam     Arthroscopic findings revealed no fluid upon entry into the joint.      Arthroscopic examination of the patellofemoral compartment showed clear signs of the demarcation of the superior lateral ossicle as evidenced by cartilage disruption along this fissure line.    Remaining cartilage surfaces of the patella was pristine as well as the trochlea cartilage.  Tibiofemoral joint showed satisfactory cartilage surfaces and normal meniscus      PROCEDURE: Under  General endotracheal anesthesia, the patient was positioned supine on the operating table. T  he patient's leg was prepped and draped in the usual sterile fashion. A pause was performed identifying the correct leg and the administration of antibiotics.   A diagnostic arthroscopy was performed using anterolateral portal for inflow and visualization. Findings as above.   Instruments were then removed from the knee. Tourniquet was elevated to 250 mmHg after Esmarch exsanguination of the leg.   A lateral incision was used lateral to the patella approximately the length of the patella. This was carried down to lateral retinaculum.  We divided layer one from layer 2.     We then lifted dissected layer 2 to uncover the superior lateral aspect of the patella.   Using in the 18-gauge spinal needle we were able to identify the fissure from the anterior aspect of the patella.    We then peeled back the retinaculum to expose the ossicle and excised the ossicle in its entirety.      Because this left a slight lateral prominence on the inferior aspect of the patella, this was also smoothed down with the use of a rounger to approach a more smooth curvilinear aspect of the lateral patella   Tourniquet was let down at this point in time. Total tourniquet time was 22 minutes.     Closure ensued by closing the near end of layer one to the far end of layer 2, effecting a small lengthening of the retinaclum.    Subcutaneous tissue was closed with 2-0 Vicryl. The skin was closed with running Monocryl.  Dermabond, a surgical alginate, a sterile  dressing and tagaderm was applied.  A Tubi- stockinette was put in place.     The patient tolerated the procedure well and was taken to the recovery room in satisfactory condition.   Patient will be maintained weightbearing as tolerated in a knee immobilizer She can remove the knee immobilizer for gentle range of motion and sitting position as tolerates.    She will follow the lateral retinacular lengthening protocol.  She should use this for all weightbearing activities for 2 weeks' time, after which she will follow a functional return to activities.    PA was an essential part of the case to help manage the limb in the OR, help with tissue retraction to ensure maximum exposure and maximum surgical efficiency,   both which promotes best practice and best surgical outcomes.  There was no resdient learner available for the case.   JASEN CHURCH MD

## 2024-05-02 NOTE — ANESTHESIA CARE TRANSFER NOTE
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    Anesthesia Type:   General     Note:    Oropharynx: spontaneously breathing  Level of Consciousness: awake  Oxygen Supplementation: room air    Independent Airway: airway patency satisfactory and stable  Dentition: dentition unchanged  Vital Signs Stable: post-procedure vital signs reviewed and stable  Report to RN Given: handoff report given  Patient transferred to: PACU  Comments: Resps easy and regular. Report to PACU RN  Handoff Report: Identifed the Patient, Identified the Reponsible Provider, Reviewed the pertinent medical history, Discussed the surgical course, Reviewed Intra-OP anesthesia mangement and issues during anesthesia, Set expectations for post-procedure period and Allowed opportunity for questions and acknowledgement of understanding      Vitals:  Vitals Value Taken Time   BP 93/52 05/02/24 1345   Temp 36.2  C (97.1  F) 05/02/24 1341   Pulse 74 05/02/24 1348   Resp 34 05/02/24 1349   SpO2 99 % 05/02/24 1350   Vitals shown include unfiled device data.    Electronically Signed By: JODY GREEN CRNA  May 2, 2024  1:51 PM

## 2024-05-02 NOTE — ANESTHESIA POSTPROCEDURE EVALUATION
Patient: Smitha Seaman    Procedure: Procedure(s):  left knee arthroscopy, excision of accessory patellar ossicle, lateral retinacular lengthening       Anesthesia Type:  General    Note:  Disposition: Outpatient   Postop Pain Control: Uneventful            Sign Out: Well controlled pain   PONV: No   Neuro/Psych: Uneventful            Sign Out: Acceptable/Baseline neuro status   Airway/Respiratory: Uneventful            Sign Out: Acceptable/Baseline resp. status   CV/Hemodynamics: Uneventful            Sign Out: Acceptable CV status; No obvious hypovolemia; No obvious fluid overload   Other NRE: NONE   DID A NON-ROUTINE EVENT OCCUR? No       Last vitals:  Vitals Value Taken Time   BP 98/59 05/02/24 1351   Temp 36.1  C (97  F) 05/02/24 1351   Pulse 74 05/02/24 1348   Resp 18 05/02/24 1351   SpO2 10 % 05/02/24 1351   Vitals shown include unfiled device data.    Electronically Signed By: Sybil Gauthier MD  May 2, 2024  2:26 PM

## 2024-05-07 ENCOUNTER — THERAPY VISIT (OUTPATIENT)
Dept: PHYSICAL THERAPY | Facility: CLINIC | Age: 24
End: 2024-05-07
Attending: ORTHOPAEDIC SURGERY
Payer: COMMERCIAL

## 2024-05-07 DIAGNOSIS — Z98.890 S/P KNEE SURGERY: ICD-10-CM

## 2024-05-07 PROCEDURE — 97161 PT EVAL LOW COMPLEX 20 MIN: CPT | Mod: GP | Performed by: PHYSICAL THERAPIST

## 2024-05-07 PROCEDURE — 97110 THERAPEUTIC EXERCISES: CPT | Mod: GP | Performed by: PHYSICAL THERAPIST

## 2024-05-07 ASSESSMENT — ACTIVITIES OF DAILY LIVING (ADL)
PAIN: THE SYMPTOM AFFECTS MY ACTIVITY MODERATELY
GO DOWN STAIRS: ACTIVITY IS VERY DIFFICULT
SQUAT: I AM UNABLE TO DO THE ACTIVITY
WALK: ACTIVITY IS FAIRLY DIFFICULT
PLEASE_INDICATE_YOR_PRIMARY_REASON_FOR_REFERRAL_TO_THERAPY:: KNEE
LIMPING: THE SYMPTOM AFFECTS MY ACTIVITY SEVERELY
KNEEL ON THE FRONT OF YOUR KNEE: I AM UNABLE TO DO THE ACTIVITY
LIMPING: THE SYMPTOM AFFECTS MY ACTIVITY SEVERELY
WALK: ACTIVITY IS FAIRLY DIFFICULT
KNEE_ACTIVITY_OF_DAILY_LIVING_SCORE: 22.86
GO UP STAIRS: ACTIVITY IS VERY DIFFICULT
AS_A_RESULT_OF_YOUR_KNEE_INJURY,_HOW_WOULD_YOU_RATE_YOUR_CURRENT_LEVEL_OF_DAILY_ACTIVITY?: SEVERELY ABNORMAL
WEAKNESS: THE SYMPTOM AFFECTS MY ACTIVITY SEVERELY
STAND: ACTIVITY IS FAIRLY DIFFICULT
RAW_SCORE: 16
HOW_WOULD_YOU_RATE_THE_OVERALL_FUNCTION_OF_YOUR_KNEE_DURING_YOUR_USUAL_DAILY_ACTIVITIES?: ABNORMAL
KNEE_ACTIVITY_OF_DAILY_LIVING_SUM: 16
KNEEL ON THE FRONT OF YOUR KNEE: I AM UNABLE TO DO THE ACTIVITY
AS_A_RESULT_OF_YOUR_KNEE_INJURY,_HOW_WOULD_YOU_RATE_YOUR_CURRENT_LEVEL_OF_DAILY_ACTIVITY?: SEVERELY ABNORMAL
GIVING WAY, BUCKLING OR SHIFTING OF KNEE: THE SYMPTOM AFFECTS MY ACTIVITY MODERATELY
SWELLING: THE SYMPTOM AFFECTS MY ACTIVITY MODERATELY
GIVING WAY, BUCKLING OR SHIFTING OF KNEE: THE SYMPTOM AFFECTS MY ACTIVITY MODERATELY
GO DOWN STAIRS: ACTIVITY IS VERY DIFFICULT
WEAKNESS: THE SYMPTOM AFFECTS MY ACTIVITY SEVERELY
PAIN: THE SYMPTOM AFFECTS MY ACTIVITY MODERATELY
SWELLING: THE SYMPTOM AFFECTS MY ACTIVITY MODERATELY
HOW_WOULD_YOU_RATE_THE_OVERALL_FUNCTION_OF_YOUR_KNEE_DURING_YOUR_USUAL_DAILY_ACTIVITIES?: ABNORMAL
SIT WITH YOUR KNEE BENT: I AM UNABLE TO DO THE ACTIVITY
STAND: ACTIVITY IS FAIRLY DIFFICULT
SQUAT: I AM UNABLE TO DO THE ACTIVITY
RISE FROM A CHAIR: ACTIVITY IS VERY DIFFICULT
RISE FROM A CHAIR: ACTIVITY IS VERY DIFFICULT
STIFFNESS: THE SYMPTOM AFFECTS MY ACTIVITY SEVERELY
STIFFNESS: THE SYMPTOM AFFECTS MY ACTIVITY SEVERELY
GO UP STAIRS: ACTIVITY IS VERY DIFFICULT
SIT WITH YOUR KNEE BENT: I AM UNABLE TO DO THE ACTIVITY

## 2024-05-07 NOTE — PROGRESS NOTES
PHYSICAL THERAPY EVALUATION  Type of Visit: Evaluation    See electronic medical record for Abuse and Falls Screening details.    Subjective       Presenting condition or subjective complaint: Start Rehab for L knee surgery  Date of onset: 05/02/24    Relevant medical history: -- (none)   Dates & types of surgery: S/P R partial lateral facectomy 3/2023    Prior diagnostic imaging/testing results: MRI     Prior therapy history for the same diagnosis, illness or injury: No          Living Environment  Social support: With family members   Type of home: [facility]   Stairs to enter the home:         Ramp: No   Stairs inside the home: Yes   Is there a railing: Yes   Help at home: Self Cares (home health aide/personal care attendant, family, etc); Home management tasks (cooking, cleaning)  Equipment owned:       Employment: Yes collegiate   Hobbies/Interests:      Patient goals for therapy: walking and up/down stairs      Objective   KNEE EVALUATION  PAIN: Pain Level at Rest: 4/10  Pain Level with Use: 4/10  Pain Location: general L knee joint, lateral patellar area  Pain Quality: Aching, Dull, Nagging, Tender, Throbbing, and Tiring  Pain Frequency: intermittent  Pain is Worst: nighttime  Pain is Exacerbated By: weight bearing(standing), bending  Pain is Relieved By: cold, otc medications, and rest  Pain Progression: Improved  INTEGUMENTARY (edema, incisions):  L knee circumference 37.5 cm    GAIT:  Weightbearing Status: WBAT  Assistive Device(s):  knee immobilizer  Gait Deviations: Stride length decreased  Lilia decreased  Heel strike decreased L, Push off decreased L  Knee flexion decreased L, L hip circumduction  BALANCE/PROPRIOCEPTION: Single Leg Stance Eyes Open (seconds): Unable to SLS on L  WEIGHTBEARING ALIGNMENT:  Reduced weight on L shifting weight to R    ROM:   (Degrees) Left AROM Left PROM  Right AROM Right PROM   Knee Flexion 90      Knee Extension 0          STRENGTH:   Pain: - none + mild ++ moderate  +++ severe  Strength Scale: 0-5/5 Left Right   Knee Flexion 4-, + (mild) 5, - (none)   Knee Extension 4-, ++ (mod) 5, - (none)   Quad Set 4 5     FLEXIBILITY:  L quad and gastroc  SPECIAL TESTS:  NT  FUNCTIONAL TESTS:  NT  JOINT MOBILITY:    Left Right   Tib-Fib Proximal     Patellofemoral Medial Hypomobile    Patellofemoral Lateral Hypomobile    Patellofemoral Superior Hypomobile    Patellofemoral Inferior Hypomobile             Assessment & Plan   CLINICAL IMPRESSIONS  Medical Diagnosis: s/p partial lateral facetectomy    Treatment Diagnosis: s/p partial lateral facetectomy   Impression/Assessment: Patient is a 24 year old female with L knee complaints.  The following significant findings have been identified: Pain, Decreased ROM/flexibility, Decreased joint mobility, Decreased strength, Decreased proprioception, Impaired gait, and Decreased activity tolerance. These impairments interfere with their ability to perform self care tasks, work tasks, household chores, driving , and household mobility as compared to previous level of function.     Clinical Decision Making (Complexity):  Clinical Presentation: Stable/Uncomplicated  Clinical Presentation Rationale: based on medical and personal factors listed in PT evaluation  Clinical Decision Making (Complexity): Low complexity    PLAN OF CARE  Treatment Interventions:  Interventions: Gait Training, Manual Therapy, Neuromuscular Re-education, Therapeutic Activity, Therapeutic Exercise, Self-Care/Home Management    Long Term Goals     PT Goal 1  Goal Identifier: stairs  Goal Description: Pt will be able to ascend and descend steps reciprocally with PL 0-2/10  Rationale: to maximize safety and independence with performance of ADLs and functional tasks;to maximize safety and independence within the home;to maximize safety and independence within the community;to maximize safety and independence with self cares  Goal Progress: Unable to use a reciprocal pattern up/down  "steps, rather \"lora to\" pattern with PL 4-6/10  Target Date: 07/09/24      Frequency of Treatment: 2x/week for 3 weeks then 1x/week for 6 weeks  Duration of Treatment: 9 weeks    Recommended Referrals to Other Professionals: Physical Therapy  Education Assessment:   Learner/Method: Patient;Listening;Reading;Demonstration;Pictures/Video;No Barriers to Learning    Risks and benefits of evaluation/treatment have been explained.   Patient/Family/caregiver agrees with Plan of Care.     Evaluation Time:     PT Eval, Low Complexity Minutes (54991): 15       Signing Clinician: Dora Cantor PT          "

## 2024-05-14 ENCOUNTER — THERAPY VISIT (OUTPATIENT)
Dept: PHYSICAL THERAPY | Facility: CLINIC | Age: 24
End: 2024-05-14
Attending: ORTHOPAEDIC SURGERY
Payer: COMMERCIAL

## 2024-05-14 DIAGNOSIS — Z98.890 S/P KNEE SURGERY: Primary | ICD-10-CM

## 2024-05-14 PROCEDURE — 97110 THERAPEUTIC EXERCISES: CPT | Mod: GP

## 2024-05-14 PROCEDURE — 97140 MANUAL THERAPY 1/> REGIONS: CPT | Mod: GP

## 2024-05-16 ENCOUNTER — THERAPY VISIT (OUTPATIENT)
Dept: PHYSICAL THERAPY | Facility: CLINIC | Age: 24
End: 2024-05-16
Attending: ORTHOPAEDIC SURGERY
Payer: COMMERCIAL

## 2024-05-16 DIAGNOSIS — Z98.890 S/P KNEE SURGERY: Primary | ICD-10-CM

## 2024-05-16 PROCEDURE — 97140 MANUAL THERAPY 1/> REGIONS: CPT | Mod: GP | Performed by: PHYSICAL THERAPIST

## 2024-05-16 PROCEDURE — 97110 THERAPEUTIC EXERCISES: CPT | Mod: GP | Performed by: PHYSICAL THERAPIST

## 2024-05-16 ASSESSMENT — ACTIVITIES OF DAILY LIVING (ADL)
SIT WITH YOUR KNEE BENT: ACTIVITY IS NOT DIFFICULT
HOW_WOULD_YOU_RATE_THE_CURRENT_FUNCTION_OF_YOUR_KNEE_DURING_YOUR_USUAL_DAILY_ACTIVITIES_ON_A_SCALE_FROM_0_TO_100_WITH_100_BEING_YOUR_LEVEL_OF_KNEE_FUNCTION_PRIOR_TO_YOUR_INJURY_AND_0_BEING_THE_INABILITY_TO_PERFORM_ANY_OF_YOUR_USUAL_DAILY_ACTIVITIES?: 60
SWELLING: THE SYMPTOM AFFECTS MY ACTIVITY SLIGHTLY
AS_A_RESULT_OF_YOUR_KNEE_INJURY,_HOW_WOULD_YOU_RATE_YOUR_CURRENT_LEVEL_OF_DAILY_ACTIVITY?: ABNORMAL
GO DOWN STAIRS: ACTIVITY IS SOMEWHAT DIFFICULT
KNEEL ON THE FRONT OF YOUR KNEE: I AM UNABLE TO DO THE ACTIVITY
RISE FROM A CHAIR: ACTIVITY IS NOT DIFFICULT
LIMPING: THE SYMPTOM AFFECTS MY ACTIVITY SLIGHTLY
KNEE_ACTIVITY_OF_DAILY_LIVING_SUM: 46
GO UP STAIRS: ACTIVITY IS MINIMALLY DIFFICULT
WALK: ACTIVITY IS MINIMALLY DIFFICULT
HOW_WOULD_YOU_RATE_THE_OVERALL_FUNCTION_OF_YOUR_KNEE_DURING_YOUR_USUAL_DAILY_ACTIVITIES?: NEARLY NORMAL
KNEE_ACTIVITY_OF_DAILY_LIVING_SCORE: 65.71
STIFFNESS: I HAVE THE SYMPTOM BUT IT DOES NOT AFFECT MY ACTIVITY
PAIN: THE SYMPTOM PREVENTS ME FROM ALL DAILY ACTIVITIES
WEAKNESS: THE SYMPTOM AFFECTS MY ACTIVITY SLIGHTLY
STAND: ACTIVITY IS NOT DIFFICULT
RAW_SCORE: 46
SQUAT: ACTIVITY IS FAIRLY DIFFICULT
GIVING WAY, BUCKLING OR SHIFTING OF KNEE: I DO NOT HAVE THE SYMPTOM

## 2024-05-19 PROBLEM — Z98.890 S/P KNEE SURGERY: Status: RESOLVED | Noted: 2024-05-14 | Resolved: 2024-05-19

## 2024-05-20 NOTE — PROGRESS NOTES
"    DISCHARGE       05/16/24 0500   Appointment Info   Signing clinician's name / credentials [email or web address]   Total/Authorized Visits E&T(10)   Visits Used 3   Medical Diagnosis s/p partial lateral facetectomy   PT Tx Diagnosis s/p partial lateral facetectomy   Progress Note/Certification   Onset of illness/injury or Date of Surgery 05/02/24   Therapy Frequency 2x/week for 3 weeks then 1x/week for 6 weeks   Predicted Duration 9 weeks   Progress Note Due Date 05/16/24   Progress Note Completed Date 05/07/24   PT Goal 1   Goal Identifier stairs   Goal Description Pt will be able to ascend and descend steps reciprocally with PL 0-2/10   Rationale to maximize safety and independence with performance of ADLs and functional tasks;to maximize safety and independence within the home;to maximize safety and independence within the community;to maximize safety and independence with self cares   Goal Progress Able to use a reciprocal pattern up steps with a railing, PL 3/10, continues to use \"step to\" pattern down steps.   Target Date 07/09/24   Subjective Report   Subjective Report L knee pain now intermittent, currently patient reporting no pain. Able to ascend steps with a reciprocal pattern using a railing. Will be leving in sveral days to return home to [place]. She will continue with her rehab with the athletic trainers for the [employer].   Objective Measures   Objective Measures Objective Measure 1;Objective Measure 2;Objective Measure 3;Objective Measure 4   Objective Measure 1   Objective Measure Patient ambulating into clinic without KI with slight antalgic gait. Visible HS and PO.   Objective Measure 2   Objective Measure AROM of L knee: 0-0-90, able to bend L knee to 98 while on stationary bike..   Details Improved strength of L knee: flexion 4/5(from 4-/5), extension 4/5(from 4-/5), quas setting 4/5.   Objective Measure 3   Objective Measure Improved L patellar mobility in all directions   Details L " knee edema remains localized to L knee joint area, slightly improved overall from 37.5 cm to 37.0 cm at joint line   Objective Measure 4   Objective Measure Knee outcome score improved from 22.86 to 65.71   Treatment Interventions (PT)   Interventions Therapeutic Procedure/Exercise;Manual Therapy;Neuromuscular Re-education   Therapeutic Procedure/Exercise   Therapeutic Procedures: strength, endurance, ROM, flexibility minutes (88409) 26   Ther Proc 1 Seat ht 7 x6'   PTRx Ther Proc 1 Seated Knee Extension With Theraband   PTRx Ther Proc 1 - Details YTB x20 L with eccentric control, YTB felt challenging and better than RTB   PTRx Ther Proc 2 Standing Hamstring Curl Knee Flexion   PTRx Ther Proc 2 - Details YTB x20 L with eccentric control, YTB felt challenging and better than RTB   PTRx Ther Proc 3 Knee Bends   PTRx Ther Proc 3 - Details x20 pain free motion, reviewed, pt demonstrates good form   PTRx Ther Proc 4 Standing Hip Flexion   PTRx Ther Proc 4 - Details x20 with cues for pelvic obliquity stability   PTRx Ther Proc 5 Hip AROM Standing Abduction   PTRx Ther Proc 5 - Details x20 with cues for pelvic obliquity stability   PTRx Ther Proc 6 Isometric Quad   PTRx Ther Proc 6 - Details x5 for review   PTRx Ther Proc 7 Hip Flexion Straight Leg Raise   PTRx Ther Proc 7 - Details x5 for review   PTRx Ther Proc 8 Hip Abduction Straight Leg Raise   PTRx Ther Proc 8 - Details x5 for review   PTRx Ther Proc 9 Hip Extension Straight Leg Raise   PTRx Ther Proc 9 - Details x5 for review   PTRx Ther Proc 10 Supine Heel Slides   PTRx Ther Proc 10 - Details x5 for review   PTRx Ther Proc 11 Seated Heel Slide with Foot on the Floor   PTRx Ther Proc 11 - Details x5 for review   Skilled Intervention Progression of activity, strengthening exercise   Patient Response/Progress No sx aggravation, good form with all activity   Ther Proc 1 - Details full revolution entire duration with knee flexion to 98, no pain rather stiffness  initially   Ther Proc 2 Supine: manual L hip(glut, piriformis stretching)   Ther Proc 2 - Details R SL: manual stetching of L hip flexors and L ITB   Therapeutic Procedures Ther Proc 2   Neuromuscular Re-education   Neuromuscular re-ed of mvmt, balance, coord, kinesthetic sense, posture, proprioception minutes (72955) 4   Neuromuscular Re-education Neuro Re-ed 2   Neuro Re-ed 1 Side stepping 4x25' with YTB   Neuro Re-ed 1 - Details Issued YTB for HEP   Skilled Intervention Proximal hip strength   Patient Response/Progress fatiguing, no sx aggravation   Manual Therapy   Manual Therapy: Mobilization, MFR, MLD, friction massage minutes (26086) 8   Manual Therapy 1 Gentle patella mobilization GII medial glide   Manual Therapy 1 - Details hamstring STM, STM to L knee   Patient Response/Progress No pain with patellar mobility, no significant improvement in L knee edema   Skilled Intervention improve patellar mobility, decreased edema   Education   Learner/Method Patient;Listening;Reading;Demonstration;Pictures/Video;No Barriers to Learning   Plan   Home program See PTRx   Plan for next session DC PT as pt is returning home   Total Session Time   Timed Code Treatment Minutes 38   Total Treatment Time (sum of timed and untimed services) 38     Reason for Discharge: Patient returning to home in [place].    Equipment Issued: YTB    Discharge Plan: Patient to continue home program in [place]    Referring Provider:  Poonam Hassan

## 2024-06-11 ENCOUNTER — VIRTUAL VISIT (OUTPATIENT)
Dept: ORTHOPEDICS | Facility: CLINIC | Age: 24
End: 2024-06-11
Payer: COMMERCIAL

## 2024-06-11 DIAGNOSIS — Z98.890 S/P KNEE SURGERY: Primary | ICD-10-CM

## 2024-06-11 PROCEDURE — 99024 POSTOP FOLLOW-UP VISIT: CPT | Mod: 95 | Performed by: ORTHOPAEDIC SURGERY

## 2024-06-11 NOTE — LETTER
6/11/2024      Smitha Seaman  [address]  [address] [place] [id number]      Dear Colleague,    Thank you for referring your patient, Smitha Seaman, to the [facility] [place]. Please see a copy of my visit note below.    Reason For Visit:   Chief Complaint   Patient presents with    RECHECK     video visit shravan, DOS: 5/2/24 //left knee arthroscopy, partial lateral facetectomy       Primary MD: Nikolas [employer]  Ref. MD: EST    ?  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     Date of injury: 2022  Type of injury: [employer] sports injury.     Date of surgery: 3/2/23  Type of surgery: right knee arthroscopy with excision of accessory patellar ossicle x2     5/2/24 left knee arthroscopy, excision of accessory patellar ossicle, lateral retinacular lengthening      Smoker: No  Request smoking cessation information: No    LMP 04/25/2024 (Exact Date)     Pain Assessment  Patient Currently in Pain: Yes  0-10 Pain Scale: 3  Primary Pain Location: Knee       Candida Careyo, VIRGINIAN     Smitha Seaman is a 24 year old who is being evaluated via a billable video visit.      How would you like to obtain your AVS? [facility]      Video-Visit Details    Type of service:  Video Visit   Video Start Time:  9:00  Video End Time: 9:10    Originating Location (pt. Location): Home    Distant Location (provider location):  On-site  Platform used for Video Visit: Abbey Bower  Patient is a 24-year-old female who is seen virtually.  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    She had the contralateral knee done in March 2023.    She feels that she is doing quite well.  She is having virtually no pain.  She is no longer using  any kind of ambulatory aid.  She feels things are going faster than the contralateral side.    By virtual visualization she has a benign-appearing incision of her left knee.  Good straight leg raising effort without a lag.  Range of motion full extension to greater than 140 degrees of flexion.  She can touch her heel to her but.    Assessment: Excellent early postoperative results to date.    Plan: Patient will follow along the same plan postoperatively as she did for the opposite knee.    I would like to get x-rays of her knee.  She will be back to [place] in late July where we will obtain AP lateral standing, 20 degree axial view of her left knee.    We also discussed closing her athletic chart from the [employer].  At this point in time we anticipate full recovery of healing from this left knee surgery.  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  We will close her medical chart.    Poonam Hassan MD  Professor Orthopedic Surgery  [facility]

## 2024-06-11 NOTE — PROGRESS NOTES
Reason For Visit:   Chief Complaint   Patient presents with    RECHECK     video visit aidanrenatecathy, DOS: 5/2/24 //left knee arthroscopy, partial lateral facetectomy       Primary MD: Nikolas [employer]  Ref. MD: EST    ?  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     Date of injury: 2022  Type of injury: [employer] sports injury.     Date of surgery: 3/2/23  Type of surgery: right knee arthroscopy with excision of accessory patellar ossicle x2     5/2/24 left knee arthroscopy, excision of accessory patellar ossicle, lateral retinacular lengthening      Smoker: No  Request smoking cessation information: No    LMP 04/25/2024 (Exact Date)     Pain Assessment  Patient Currently in Pain: Yes  0-10 Pain Scale: 3  Primary Pain Location: Knee       Candida Tomlin LPN     Smitha Seaman is a 24 year old who is being evaluated via a billable video visit.      How would you like to obtain your AVS? [facility]      Video-Visit Details    Type of service:  Video Visit   Video Start Time:  9:00  Video End Time: 9:10    Originating Location (pt. Location): Home    Distant Location (provider location):  On-site  Platform used for Video Visit: Abbey Bower  Patient is a 24-year-old female who is seen virtually.  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    She had the contralateral knee done in March 2023.    She feels that she is doing quite well.  She is having virtually no pain.  She is no longer using any kind of ambulatory aid.  She feels things are going faster than the contralateral side.    By virtual visualization she has a benign-appearing incision of her left knee.  Good straight leg raising effort without a lag.  Range of motion full extension to greater  than 140 degrees of flexion.  She can touch her heel to her but.    Assessment: Excellent early postoperative results to date.    Plan: Patient will follow along the same plan postoperatively as she did for the opposite knee.    I would like to get x-rays of her knee.  She will be back to [place] in late July where we will obtain AP lateral standing, 20 degree axial view of her left knee.    We also discussed closing her athletic chart from the [employer].  At this point in time we anticipate full recovery of healing from this left knee surgery.  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  We will close her medical chart.    Poonam Hassan MD  Professor Orthopedic Surgery  [facility]

## 2024-07-17 DIAGNOSIS — R10.9 FLANK PAIN: ICD-10-CM

## 2024-07-17 DIAGNOSIS — N39.0 URINARY TRACT INFECTION WITHOUT HEMATURIA, SITE UNSPECIFIED: ICD-10-CM

## 2024-07-17 RX ORDER — SULFAMETHOXAZOLE/TRIMETHOPRIM 800-160 MG
1 TABLET ORAL 2 TIMES DAILY
Qty: 10 TABLET | Refills: 1 | Status: SHIPPED | OUTPATIENT
Start: 2024-07-17

## 2024-12-29 ENCOUNTER — HEALTH MAINTENANCE LETTER (OUTPATIENT)
Age: 24
End: 2024-12-29

## (undated) DEVICE — LINEN GOWN XLG 5407

## (undated) DEVICE — PACK ARTHROSCOPY CUSTOM ASC

## (undated) DEVICE — Device

## (undated) DEVICE — ESU GROUND PAD ADULT W/CORD E7507

## (undated) DEVICE — SU VICRYL 1 CT-2 27" J335H

## (undated) DEVICE — DRSG STERI STRIP 1/2X4" R1547

## (undated) DEVICE — LINEN TOWEL PACK X5 5464

## (undated) DEVICE — TUBING SYSTEM ARTHREX PATIENT REDEUCE AR-6421

## (undated) DEVICE — GLOVE PROTEXIS POWDER FREE SMT 6.5  2D72PT65X

## (undated) DEVICE — LINEN ORTHO PACK 5446

## (undated) DEVICE — COVER CAMERA IN-LIGHT DISP LT-C02

## (undated) DEVICE — TUBING SYSTEM ARTHREX PUMP REDEUCE AR-6411

## (undated) DEVICE — SUCTION MANIFOLD NEPTUNE 2 SYS 4 PORT 0702-020-000

## (undated) DEVICE — PREP CHLORAPREP 26ML TINTED ORANGE  260815

## (undated) DEVICE — SU VICRYL 2-0 CT-2 27" UND J269H

## (undated) DEVICE — GLOVE GAMMEX NEOPRENE ULTRA SZ 6.5 LF 8513

## (undated) DEVICE — SU MONOCRYL 3-0 PS-1 27" Y936H

## (undated) DEVICE — BNDG ESMARK 6" STERILE LF 820-3612

## (undated) DEVICE — LIGHT HANDLE X1 31140133

## (undated) DEVICE — ESU PENCIL SMOKE EVAC W/ROCKER SWITCH 0703-047-000

## (undated) DEVICE — SOL NACL 0.9% IRRIG 3000ML BAG 2B7477

## (undated) DEVICE — GLOVE BIOGEL PI MICRO SZ 6.5 48565

## (undated) RX ORDER — ACETAMINOPHEN 325 MG/1
TABLET ORAL
Status: DISPENSED
Start: 2023-03-02

## (undated) RX ORDER — MINERAL OIL
OIL (ML) MISCELLANEOUS
Status: DISPENSED
Start: 2024-05-02

## (undated) RX ORDER — FENTANYL CITRATE 50 UG/ML
INJECTION, SOLUTION INTRAMUSCULAR; INTRAVENOUS
Status: DISPENSED
Start: 2023-03-02

## (undated) RX ORDER — KETOROLAC TROMETHAMINE 30 MG/ML
INJECTION, SOLUTION INTRAMUSCULAR; INTRAVENOUS
Status: DISPENSED
Start: 2023-03-02

## (undated) RX ORDER — BUPIVACAINE HYDROCHLORIDE 2.5 MG/ML
INJECTION, SOLUTION INFILTRATION; PERINEURAL
Status: DISPENSED
Start: 2023-03-02

## (undated) RX ORDER — ACETAMINOPHEN 325 MG/1
TABLET ORAL
Status: DISPENSED
Start: 2024-05-02

## (undated) RX ORDER — FENTANYL CITRATE 50 UG/ML
INJECTION, SOLUTION INTRAMUSCULAR; INTRAVENOUS
Status: DISPENSED
Start: 2024-05-02

## (undated) RX ORDER — DEXAMETHASONE SODIUM PHOSPHATE 4 MG/ML
INJECTION, SOLUTION INTRA-ARTICULAR; INTRALESIONAL; INTRAMUSCULAR; INTRAVENOUS; SOFT TISSUE
Status: DISPENSED
Start: 2023-03-02

## (undated) RX ORDER — HYDROMORPHONE HYDROCHLORIDE 1 MG/ML
INJECTION, SOLUTION INTRAMUSCULAR; INTRAVENOUS; SUBCUTANEOUS
Status: DISPENSED
Start: 2023-03-02

## (undated) RX ORDER — MINERAL OIL
OIL (ML) MISCELLANEOUS
Status: DISPENSED
Start: 2023-03-02

## (undated) RX ORDER — PROPOFOL 10 MG/ML
INJECTION, EMULSION INTRAVENOUS
Status: DISPENSED
Start: 2023-03-02

## (undated) RX ORDER — LIDOCAINE HYDROCHLORIDE 10 MG/ML
INJECTION, SOLUTION EPIDURAL; INFILTRATION; INTRACAUDAL; PERINEURAL
Status: DISPENSED
Start: 2022-04-29

## (undated) RX ORDER — ONDANSETRON 2 MG/ML
INJECTION INTRAMUSCULAR; INTRAVENOUS
Status: DISPENSED
Start: 2023-03-02

## (undated) RX ORDER — CEFAZOLIN SODIUM 2 G/50ML
SOLUTION INTRAVENOUS
Status: DISPENSED
Start: 2023-03-02

## (undated) RX ORDER — CEFAZOLIN SODIUM 2 G/50ML
SOLUTION INTRAVENOUS
Status: DISPENSED
Start: 2024-05-02